# Patient Record
Sex: MALE | Race: WHITE | URBAN - METROPOLITAN AREA
[De-identification: names, ages, dates, MRNs, and addresses within clinical notes are randomized per-mention and may not be internally consistent; named-entity substitution may affect disease eponyms.]

---

## 2021-11-03 ENCOUNTER — OFFICE VISIT (OUTPATIENT)
Dept: FAMILY MEDICINE CLINIC | Age: 46
End: 2021-11-03
Payer: MEDICAID

## 2021-11-03 VITALS
BODY MASS INDEX: 33.07 KG/M2 | HEART RATE: 87 BPM | OXYGEN SATURATION: 97 % | SYSTOLIC BLOOD PRESSURE: 120 MMHG | HEIGHT: 75 IN | WEIGHT: 266 LBS | TEMPERATURE: 98 F | DIASTOLIC BLOOD PRESSURE: 82 MMHG

## 2021-11-03 DIAGNOSIS — G47.00 INSOMNIA, UNSPECIFIED TYPE: ICD-10-CM

## 2021-11-03 DIAGNOSIS — F25.1 SCHIZOAFFECTIVE DISORDER, DEPRESSIVE TYPE (HCC): ICD-10-CM

## 2021-11-03 DIAGNOSIS — K21.9 GASTROESOPHAGEAL REFLUX DISEASE WITHOUT ESOPHAGITIS: ICD-10-CM

## 2021-11-03 DIAGNOSIS — I10 ESSENTIAL HYPERTENSION: Primary | ICD-10-CM

## 2021-11-03 DIAGNOSIS — F90.9 ATTENTION DEFICIT HYPERACTIVITY DISORDER (ADHD), UNSPECIFIED ADHD TYPE: ICD-10-CM

## 2021-11-03 PROCEDURE — G8427 DOCREV CUR MEDS BY ELIG CLIN: HCPCS | Performed by: NURSE PRACTITIONER

## 2021-11-03 PROCEDURE — G8484 FLU IMMUNIZE NO ADMIN: HCPCS | Performed by: NURSE PRACTITIONER

## 2021-11-03 PROCEDURE — 1036F TOBACCO NON-USER: CPT | Performed by: NURSE PRACTITIONER

## 2021-11-03 PROCEDURE — 99204 OFFICE O/P NEW MOD 45 MIN: CPT | Performed by: NURSE PRACTITIONER

## 2021-11-03 PROCEDURE — G8419 CALC BMI OUT NRM PARAM NOF/U: HCPCS | Performed by: NURSE PRACTITIONER

## 2021-11-03 RX ORDER — OMEPRAZOLE 20 MG/1
20 CAPSULE, DELAYED RELEASE ORAL 2 TIMES DAILY
Qty: 90 CAPSULE | Refills: 1 | Status: SHIPPED | OUTPATIENT
Start: 2021-11-03 | End: 2021-12-01 | Stop reason: SDUPTHER

## 2021-11-03 RX ORDER — TRAZODONE HYDROCHLORIDE 100 MG/1
100 TABLET ORAL NIGHTLY
COMMUNITY
End: 2021-11-23 | Stop reason: SDUPTHER

## 2021-11-03 RX ORDER — ARIPIPRAZOLE 15 MG/1
15 TABLET ORAL DAILY
COMMUNITY
End: 2021-11-03 | Stop reason: SDUPTHER

## 2021-11-03 RX ORDER — FLUOXETINE HYDROCHLORIDE 20 MG/1
60 CAPSULE ORAL DAILY
COMMUNITY
End: 2021-11-03 | Stop reason: SDUPTHER

## 2021-11-03 RX ORDER — METHYLPHENIDATE HYDROCHLORIDE 36 MG/1
72 TABLET ORAL EVERY MORNING
Qty: 60 TABLET | Refills: 0 | Status: SHIPPED | OUTPATIENT
Start: 2021-11-03 | End: 2021-12-01 | Stop reason: SDUPTHER

## 2021-11-03 RX ORDER — ALPRAZOLAM 0.5 MG/1
0.5 TABLET ORAL NIGHTLY PRN
COMMUNITY
End: 2021-11-22 | Stop reason: SDUPTHER

## 2021-11-03 RX ORDER — METHYLPHENIDATE HYDROCHLORIDE 36 MG/1
36 TABLET ORAL EVERY MORNING
COMMUNITY
End: 2021-11-03 | Stop reason: SDUPTHER

## 2021-11-03 RX ORDER — AMLODIPINE BESYLATE 5 MG/1
5 TABLET ORAL DAILY
COMMUNITY
End: 2021-11-03 | Stop reason: SDUPTHER

## 2021-11-03 RX ORDER — FLUOXETINE HYDROCHLORIDE 20 MG/1
60 CAPSULE ORAL DAILY
Qty: 270 CAPSULE | Refills: 1 | Status: SHIPPED | OUTPATIENT
Start: 2021-11-03 | End: 2022-03-21 | Stop reason: SDUPTHER

## 2021-11-03 RX ORDER — OMEPRAZOLE 20 MG/1
20 CAPSULE, DELAYED RELEASE ORAL 2 TIMES DAILY
COMMUNITY
End: 2021-11-03 | Stop reason: SDUPTHER

## 2021-11-03 RX ORDER — AMLODIPINE BESYLATE 5 MG/1
5 TABLET ORAL DAILY
Qty: 90 TABLET | Refills: 1 | Status: SHIPPED | OUTPATIENT
Start: 2021-11-03 | End: 2021-12-01 | Stop reason: SDUPTHER

## 2021-11-03 RX ORDER — ARIPIPRAZOLE 15 MG/1
15 TABLET ORAL DAILY
Qty: 90 TABLET | Refills: 1 | Status: SHIPPED | OUTPATIENT
Start: 2021-11-03 | End: 2021-12-03 | Stop reason: SDUPTHER

## 2021-11-03 SDOH — ECONOMIC STABILITY: FOOD INSECURITY: WITHIN THE PAST 12 MONTHS, YOU WORRIED THAT YOUR FOOD WOULD RUN OUT BEFORE YOU GOT MONEY TO BUY MORE.: NEVER TRUE

## 2021-11-03 SDOH — ECONOMIC STABILITY: FOOD INSECURITY: WITHIN THE PAST 12 MONTHS, THE FOOD YOU BOUGHT JUST DIDN'T LAST AND YOU DIDN'T HAVE MONEY TO GET MORE.: NEVER TRUE

## 2021-11-03 ASSESSMENT — ENCOUNTER SYMPTOMS
ABDOMINAL PAIN: 0
SORE THROAT: 0
COUGH: 0
SHORTNESS OF BREATH: 0
VOMITING: 0
RHINORRHEA: 0
NAUSEA: 0
BACK PAIN: 0
CONSTIPATION: 0
ABDOMINAL DISTENTION: 0
DIARRHEA: 0
CHEST TIGHTNESS: 0

## 2021-11-03 ASSESSMENT — PATIENT HEALTH QUESTIONNAIRE - PHQ9
2. FEELING DOWN, DEPRESSED OR HOPELESS: 0
SUM OF ALL RESPONSES TO PHQ QUESTIONS 1-9: 0
SUM OF ALL RESPONSES TO PHQ QUESTIONS 1-9: 0
SUM OF ALL RESPONSES TO PHQ9 QUESTIONS 1 & 2: 0
SUM OF ALL RESPONSES TO PHQ QUESTIONS 1-9: 0
1. LITTLE INTEREST OR PLEASURE IN DOING THINGS: 0

## 2021-11-03 ASSESSMENT — SOCIAL DETERMINANTS OF HEALTH (SDOH): HOW HARD IS IT FOR YOU TO PAY FOR THE VERY BASICS LIKE FOOD, HOUSING, MEDICAL CARE, AND HEATING?: NOT HARD AT ALL

## 2021-11-03 NOTE — PROGRESS NOTES
Ray Goddard, APRN-CNP  [facility]  [id number] , [address]  [address] [address]  Dept: [phone]  Dept Fax: [phone]    Patient ID: Vish English is a 39 y.o. male. HPI:  Vish English is a 39 y.o. male who presents to the office today for a first visit and to establish a relationship with a new primary care physician. Today, the patient relates he recently moved here from [place] to be closer to his mom and his brother. He denies any major complaints. He does relate that he has been experiencing some mild discomfort in his left shoulder into his shoulder blade after getting the COVID booster. Pt denies any fever or chills. Pt today denies any HA, chest pain, or SOB. Pt denies any N/V/D/C or abdominal pain. Patient does report a history of HTN (on Amlodipine 5 mg dialy), ADHD (on Concerta 72 mg daily), schizoaffective/depressive disorder (on Prozac 20 mg daily and Abilify 15 mg daily), GERD (on Omeprazole 20 mg daily) and insomnia (on Trazodone 100 mg nightly and Xanax 0.5-1 mg nightly PRN). He does relate that he is hoping to get off of the Xanax secondary to the long term effects of being on it. Patient reports compliance with all medications and is tolerating them without side effects      Previous office notes, labs and diagnostic studies were reviewed prior to and during encounter. The patient's past medical, surgical, social, and family history as well as her current medications and allergies were reviewed as documented in today's encounter by Bita Officer, GOLD.  Preventative Care:   o Colonoscopy: never had  o PSA: N/A   o Smoking: denies  o Alcohol: denies   o Drug use: denies     Current Outpatient Medications on File Prior to Visit   Medication Sig Dispense Refill    ALPRAZolam (XANAX) 0.5 MG tablet Take 0.5 mg by mouth nightly as needed for Sleep.  Take 1-2 tablets nightly as needed      amLODIPine (NORVASC) 5 MG tablet Take 5 mg by mouth daily      FLUoxetine (PROZAC) 20 MG capsule Take 60 mg by mouth daily Take 3 capsules by mouth daily       methylphenidate (CONCERTA) 36 MG extended release tablet Take 36 mg by mouth every morning. Take 2 tablets by mouth daily      ARIPiprazole (ABILIFY) 15 MG tablet Take 15 mg by mouth daily      traZODone (DESYREL) 100 MG tablet Take 100 mg by mouth nightly Take 2 tablets by mouth every night      omeprazole (PRILOSEC) 20 MG delayed release capsule Take 20 mg by mouth 2 times daily       No current facility-administered medications on file prior to visit. SUBJECTIVE:     Review of Systems   Constitutional: Negative for activity change, fatigue and fever. HENT: Negative for congestion, ear pain, rhinorrhea and sore throat. Respiratory: Negative for cough, chest tightness and shortness of breath. Cardiovascular: Negative for chest pain and palpitations. Gastrointestinal: Negative for abdominal distention, abdominal pain, constipation, diarrhea, nausea and vomiting. Endocrine: Negative for polydipsia, polyphagia and polyuria. Genitourinary: Negative for difficulty urinating and dysuria. Musculoskeletal: Negative for arthralgias, back pain and myalgias. Skin: Negative for rash. Neurological: Negative for dizziness, weakness, light-headedness and headaches. Hematological: Negative for adenopathy. Psychiatric/Behavioral: Positive for decreased concentration (stable on Concerta), dysphoric mood (stable on Prozac & Abilify) and sleep disturbance (stable on Trazodone & Xanax). Negative for agitation and behavioral problems. The patient is nervous/anxious (stable). OBJECTIVE:  /82   Pulse 87   Temp 98 °F (36.7 °C)   Ht 6' 3\" (1.905 m)   Wt 266 lb (120.7 kg)   SpO2 97%   BMI 33.25 kg/m²     Physical Exam  Vitals and nursing note reviewed. Constitutional:       General: He is not in acute distress. Appearance: Normal appearance.  He is well-developed. HENT:      Head: Normocephalic and atraumatic. Cardiovascular:      Rate and Rhythm: Normal rate and regular rhythm. Heart sounds: Normal heart sounds. No murmur heard. Pulmonary:      Effort: Pulmonary effort is normal. No respiratory distress. Breath sounds: Normal breath sounds. Chest:      Chest wall: No tenderness. Abdominal:      General: Bowel sounds are normal.      Palpations: Abdomen is soft. Tenderness: There is no abdominal tenderness. Musculoskeletal:         General: Normal range of motion. Cervical back: Normal range of motion. Right lower leg: No edema. Left lower leg: No edema. Skin:     General: Skin is warm and dry. Findings: No rash. Neurological:      Mental Status: He is alert and oriented to person, place, and time. Psychiatric:         Mood and Affect: Mood normal.         Behavior: Behavior is cooperative. ASSESSMENT:   Diagnosis Orders   1. Essential hypertension  amLODIPine (NORVASC) 5 MG tablet   2. Gastroesophageal reflux disease without esophagitis  omeprazole (PRILOSEC) 20 MG delayed release capsule   3. Attention deficit hyperactivity disorder (ADHD), unspecified ADHD type  methylphenidate (CONCERTA) 36 MG extended release tablet     PLAN:  1. Essential hypertension  - Stable: Medication re-filled as needed, con't medications as prescribed, con't current tx plan  - BP in office today noted; 120/82  - Continue Amlodipine as prescribed by previous provider.    - amLODIPine (NORVASC) 5 MG tablet; Take 1 tablet by mouth daily  Dispense: 90 tablet; Refill: 1    2.  Gastroesophageal reflux disease without esophagitis  - Stable: Medication re-filled as needed, con't medications as prescribed, con't current tx plan  - Continue Omeprazole 20 mg daily as prescribed by previous provider.  - Patient educated on avoiding trigger food/drinks such as caffeine, soda, chocolate, greasy/fatty, spicy foods.  - Sleep with head of bed elevated, avoid lying flat after eating and avoid restrictive clothing around waist.  - omeprazole (PRILOSEC) 20 MG delayed release capsule; Take 1 capsule by mouth 2 times daily  Dispense: 90 capsule; Refill: 1    3. Attention deficit hyperactivity disorder (ADHD), unspecified ADHD type  - Stable: Medication re-filled as needed, con't medications as prescribed, con't current tx plan  - Patient admits to making careless mistakes/lacks attention to detail, has difficulty sustaining attention but feels this is much improved by the prescribed medication   - Continue Concerta 72 mg daily as prescribed by previous provider.  - OARRS report was assessed during today's visit. - Medication contract discussed, signed and scanned into media. - Patient was found to be compliant with narcotic prescription policies   - Will continue to follow every 3 months  - methylphenidate (CONCERTA) 36 MG extended release tablet; Take 2 tablets by mouth every morning for 30 days. Dispense: 60 tablet; Refill: 0     4. Schizoaffective disorder, depressive type ([facility] [address]  - Stable: Medication re-filled as needed, con't medications as prescribed, con't current tx plan  - Continue with Abilify 15 mg daily as prescribed by previous provider.   - Continue with Prozac 20 mg daily as prescribed by previous provider.     - Offered reassurance, allowed to ventilate feelings and ask questions; non-pharmological coping methods discussed. - ARIPiprazole (ABILIFY) 15 MG tablet; Take 1 tablet by mouth daily  Dispense: 90 tablet; Refill: 1  - FLUoxetine (PROZAC) 20 MG capsule; Take 3 capsules by mouth daily Take 3 capsules by mouth daily  Dispense: 270 capsule; Refill: 1    5. Insomnia, unspecified type  - Stable: Medication re-filled as needed, con't medications as prescribed, con't current tx plan  - Continue with Trazodone 100 mg nightly as prescribed by previous provider.   - Continue with Xanax 0.5 mg - 1 mg as prescribed by previous provider.   - We did discuss cutting his dosage down at his next refill to begin weaning him off.      - Discussed sleep hygiene, limiting caffeine after noon and decreasing naps during the day. - ALPRAZolam (XANAX) 0.5 MG tablet; Take 0.5 mg by mouth nightly as needed for Sleep. Take 1-2 tablets nightly as needed  - traZODone (DESYREL) 100 MG tablet; Take 100 mg by mouth nightly Take 2 tablets by mouth every night        - Medications, labs, diagnostic studies, consultations and follow-up as documented in this encounter.    - On this date November 3, 2021,  I have spent greater than 50% of this visit reviewing previous notes, test results and/or face to face with the patient discussing the diagnoses, importance of compliance with the treatment plan, counseling, coordinating care as well as documenting on the day of the visit.      DORIS Coleman-CNP

## 2021-11-03 NOTE — LETTER
CONTROLLED SUBSTANCE MEDICATION AGREEMENT     Patient Name: Raul Gregg  Patient YOB: 1975   I understand, that controlled substance medications may be used to help better manage my symptoms and to improve my ability to function at home, work and in social settings. However, I also understand that these medications do have risks, which have been discussed with me, including possible development of physical or psychological dependence. I understand that successful treatment requires mutual trust and honesty between me and my provider. I understand and agree that following this Medication Agreement is necessary in continuing my provider-patient relationship and the success of my treatment plan. Explanation from my Provider: Benefits and Goals of Controlled Substance Medications: There are two potential goals for your treatment: (1) decreased pain and suffering (2) improved daily life functions. There are many possible treatments for your chronic condition(s). Alternatives such as physical therapy, yoga, massage, home daily exercise, meditation, relaxation techniques, injections, chiropractic manipulations, surgery, cognitive therapy, hypnosis and many medications that are not habit-forming may be used. Use of controlled substance medications may be helpful, but they are unlikely to resolve all symptoms or restore all function. Explanation from my Provider: Risks of Controlled Substance Medications:  Opioid pain medications: These medications can lead to problems such as addiction/dependence, sedation, lightheadedness/dizziness, memory issues, falls, constipation, nausea, or vomiting. They may also impair the ability to drive or operate machinery. Additionally, these medications may lower testosterone levels, leading to loss of bone strength, stamina and sex drive.   They may cause problems with breathing, sleep apnea and reduced coughing, which is especially dangerous for patients with lung disease. Overdose or dangerous interactions with alcohol and other medications may occur, leading to death. Hyperalgesia may develop, which means patients receiving opioids for the treatment of pain may become more sensitive to certain painful stimuli, and in some cases, experience pain from ordinarily non-painful stimuli. Women between the ages of 14-53 who could become pregnant should carefully weigh the risks and benefits of opioids with their physicians, as these medications increase the risk of pregnancy complications, including miscarriage,  delivery and stillbirth. It is also possible for babies to be born addicted to opioids. Opioid dependence withdrawal symptoms may include; feelings of uneasiness, increased pain, irritability, belly pain, diarrhea, sweats and goose-flesh. Benzodiazepines and non-benzodiazepine sleep medications: These medications can lead to problems such as addiction/dependence, sedation, fatigue, lightheadedness, dizziness, incoordination, falls, depression, hallucinations, and impaired judgment, memory and concentration. The ability to drive and operate machinery may also be affected. Abnormal sleep-related behaviors have been reported, including sleepwalking, driving, making telephone calls, eating, or having sex while not fully awake. These medications can suppress breathing and worsen sleep apnea, particularly when combined with alcohol or other sedating medications, potentially leading to death. Dependence withdrawal symptoms may include tremors, anxiety, hallucinations and seizures. Stimulants:  Common adverse effects include addiction/dependence, increased blood  pressure and heart rate, decreased appetite, nausea, involuntary weight loss, insomnia,                                                                                                                     Initials:_______   irritability, and headaches.   These risks may increase when these medications are combined with other stimulants, such as caffeine pills or energy drinks, certain weight loss supplements and oral decongestants. Dependence withdrawal symptoms may include depressed mood, loss of interest, suicidal thoughts, anxiety, fatigue, appetite changes and agitation. Testosterone replacement therapy:  Potential side effects include increased risk of stroke and heart attack, blood clots, increased blood pressure, increased cholesterol, enlarged prostate, sleep apnea, irritability/aggression and other mood disorders, and decreased fertility. I agree and understand that I and my prescriber have the following rights and responsibilities regarding my treatment plan:     1. MY RIGHTS:  To be informed of my treatment and medication plan. To be an active participant in my health and wellbeing. 2. MY RESPONSIBILITY AND UNDERSTANDING FOR USE OF MEDICATIONS   I will take medications at the dose and frequency as directed. For my safety, I will not increase or change how I take my medications without the recommendation of my healthcare provider.  I will actively participate in any program recommended by my provider which may improve function, including social, physical, psychological programs.  I will not take my medications with alcohol or other drugs not prescribed to me. I understand that drinking alcohol with my medications increases the chances of side effects, including reduced breathing rate and could lead to personal injury when operating machinery.  I understand that if I have a history of substance use disorders, including alcohol or other illicit drugs, that I may be at increased risk of addiction to my medications.  I agree to notify my provider immediately if I should become pregnant so that my treatment plan can be adjusted.    I agree and understand that I shall only receive controlled substance medications from the prescriber that signed this agreement unless there is written agreement among other prescribers of controlled substances outlining the responsibility of the medications being prescribed.  I understand that the if the controlled medication is not helping to achieve goals, the dosage may be tapered and no longer prescribed. 3. MY RESPONSIBILITY FOR COMMUNICATION / PRESCRIPTION RENEWALS   I agree that all controlled substance medications that I take will be prescribed only by my provider. If another healthcare provider prescribes me medication in an emergency, I will notify my provider within seventy-two (72) hours.  I will arrange for refills at the prescribed interval ONLY during regular office hours. I will not ask for refills earlier than agreed, after-hours, on holidays or weekends. Refills may take up to 72 hours for processing and prescriptions to reach the pharmacy.  I will inform my other health care providers that I am taking these medications and of the existence of this [id number]. In the event of an emergency, I will provide the same information to the emergency department prescribers.  I will keep my provider updated on the pharmacy I am using for controlled medication prescription filling. Initials:_______  4. MY RESPONSIBILITY FOR PROTECTING MEDICATIONS   I will protect my prescriptions and medications. I understand that lost or misplaced prescriptions will not be replaced.  I will keep medications only for my own use and will not share them with others. I will keep all medications away from children.  I agree that if my medications are adjusted or discontinued, I will properly dispose of any remaining medications. I understand that I will be required to dispose of any remaining controlled medications as, directed by my prescriber, prior to being provided with any prescriptions for other controlled medications.   Medication drop box locations can be found at: controlled substance medications, in their original bottles, to all of my scheduled appointments. In addition, my provider may ask me to come to the practice at any time for a random pill count. 8. TERMINATION OF THIS AGREEMENT  For my safety, my prescriber has the right to stop prescribing controlled substance medications and may end this agreement. Initials:_______   Conditions that may result in termination of this agreement:  a. I do not show any improvement in pain, or my activity has not improved. b. I develop rapid tolerance or loss of improvement, as described in my treatment plan.  c. I develop significant side effects from the medication. d. My behavior is not consistent with the responsibilities outlined above, thereby causing safety concerns to continue prescribing controlled substance medications. e. I fail to follow the terms of this agreement. f. Other:____________________________       UNDERSTANDING THIS MEDICATION AGREEMENT:    I have read the above and have had all my questions answered. For chronic disease management, I know that my symptoms can be managed with many types of treatments. A chronic medication trial may be part of my treatment, but I must be an active participant in my care. Medication therapy is only one part of my symptom management plan. In some cases, there may be limited scientific evidence to support the chronic use of certain medications to improve symptoms and daily function. Furthermore, in certain circumstances, there may be scientific information that suggests that the use of chronic controlled substances may worsen my symptoms and increase my risk of unintentional death directly related to this medication therapy. I know that if my provider feels my risk from controlled medications is greater than my benefit, I will have my controlled substance medication(s) compassionately lowered or removed altogether.      I further agree to allow this office to contact my HIPAA contact if there are concerns about my safety and use of the controlled medications. I have agreed to use the prescribed controlled substance medications to me as instructed by my provider and as stated in this Medication Agreement. My initial on each page and my signature below shows that I have read each page and I have had the opportunity to ask questions with answers provided by my provider.     Patient Name (Printed): _____________________________________  Patient Signature:  ______________________   Date: _____________    Prescriber Name (Printed): ___________________________________  Prescriber Signature: _____________________  Date: _____________

## 2021-11-22 DIAGNOSIS — G47.00 INSOMNIA, UNSPECIFIED TYPE: ICD-10-CM

## 2021-11-22 RX ORDER — ALPRAZOLAM 0.5 MG/1
0.5 TABLET ORAL NIGHTLY PRN
Qty: 30 TABLET | Refills: 0 | Status: SHIPPED | OUTPATIENT
Start: 2021-11-22 | End: 2021-12-03 | Stop reason: SDUPTHER

## 2021-11-22 NOTE — TELEPHONE ENCOUNTER
----- Message from Arjun Garcia sent at 11/20/2021 10:18 AM EST -----  Subject: Refill Request    QUESTIONS  Name of Medication? Other - Alprazolam (xanax) 0.5 mg tablet  Patient-reported dosage and instructions? Take 0.5 twice a day  How many days do you have left? 2  Preferred Pharmacy? [facility] phone number (if available)? [phone]  ---------------------------------------------------------------------------  --------------,  Name of Medication? traZODone (DESYREL) 100 MG tablet  Patient-reported dosage and instructions? Take 2 tablets nightly  How many days do you have left? 5  Preferred Pharmacy? [facility] phone number (if available)? [phone]  ---------------------------------------------------------------------------  --------------  CALL BACK INFO  What is the best way for the office to contact you? OK to leave message on   voicemail  Preferred Call Back Phone Number?  [phone]
no

## 2021-11-23 ENCOUNTER — TELEPHONE (OUTPATIENT)
Dept: FAMILY MEDICINE CLINIC | Age: 46
End: 2021-11-23

## 2021-11-23 DIAGNOSIS — G47.00 INSOMNIA, UNSPECIFIED TYPE: ICD-10-CM

## 2021-11-23 RX ORDER — TRAZODONE HYDROCHLORIDE 100 MG/1
100 TABLET ORAL NIGHTLY
Qty: 180 TABLET | Refills: 1 | Status: SHIPPED | OUTPATIENT
Start: 2021-11-23 | End: 2021-12-01 | Stop reason: SDUPTHER

## 2021-11-23 NOTE — TELEPHONE ENCOUNTER
Patient states that he is to get 60 xanax because he takes 2 tabs nightly. He picked up his rx yesterday for this and it was called in for 27.   I told him that the pharmacy will probably not refill the xanax again for 30 days

## 2021-11-23 NOTE — TELEPHONE ENCOUNTER
Pharmacy downstairs calling for clarification of traZODone. Should patient be taking one or two pills nightly?

## 2021-12-01 DIAGNOSIS — I10 ESSENTIAL HYPERTENSION: ICD-10-CM

## 2021-12-01 DIAGNOSIS — G47.00 INSOMNIA, UNSPECIFIED TYPE: ICD-10-CM

## 2021-12-01 DIAGNOSIS — F90.9 ATTENTION DEFICIT HYPERACTIVITY DISORDER (ADHD), UNSPECIFIED ADHD TYPE: ICD-10-CM

## 2021-12-01 DIAGNOSIS — K21.9 GASTROESOPHAGEAL REFLUX DISEASE WITHOUT ESOPHAGITIS: ICD-10-CM

## 2021-12-01 RX ORDER — AMLODIPINE BESYLATE 5 MG/1
5 TABLET ORAL DAILY
Qty: 90 TABLET | Refills: 1 | Status: SHIPPED | OUTPATIENT
Start: 2021-12-01 | End: 2022-01-03 | Stop reason: SDUPTHER

## 2021-12-01 RX ORDER — OMEPRAZOLE 20 MG/1
20 CAPSULE, DELAYED RELEASE ORAL 2 TIMES DAILY
Qty: 90 CAPSULE | Refills: 1 | Status: SHIPPED | OUTPATIENT
Start: 2021-12-01 | End: 2022-01-03 | Stop reason: SDUPTHER

## 2021-12-01 RX ORDER — METHYLPHENIDATE HYDROCHLORIDE 36 MG/1
72 TABLET ORAL EVERY MORNING
Qty: 60 TABLET | Refills: 0 | Status: SHIPPED | OUTPATIENT
Start: 2021-12-01 | End: 2021-12-27 | Stop reason: SDUPTHER

## 2021-12-01 RX ORDER — TRAZODONE HYDROCHLORIDE 100 MG/1
100 TABLET ORAL NIGHTLY
Qty: 180 TABLET | Refills: 1 | Status: SHIPPED | OUTPATIENT
Start: 2021-12-01 | End: 2021-12-21 | Stop reason: SDUPTHER

## 2021-12-02 ENCOUNTER — NURSE ONLY (OUTPATIENT)
Dept: FAMILY MEDICINE CLINIC | Age: 46
End: 2021-12-02
Payer: MEDICAID

## 2021-12-02 DIAGNOSIS — Z23 NEED FOR VACCINATION: Primary | ICD-10-CM

## 2021-12-02 PROCEDURE — 90471 IMMUNIZATION ADMIN: CPT | Performed by: NURSE PRACTITIONER

## 2021-12-02 PROCEDURE — 90674 CCIIV4 VAC NO PRSV 0.5 ML IM: CPT | Performed by: NURSE PRACTITIONER

## 2021-12-07 DIAGNOSIS — G47.00 INSOMNIA, UNSPECIFIED TYPE: ICD-10-CM

## 2021-12-07 RX ORDER — ALPRAZOLAM 0.5 MG/1
0.5 TABLET ORAL NIGHTLY PRN
Qty: 30 TABLET | Refills: 0 | Status: CANCELLED | OUTPATIENT
Start: 2021-12-07 | End: 2022-01-06

## 2021-12-07 RX ORDER — ALPRAZOLAM 0.5 MG/1
0.5 TABLET ORAL NIGHTLY PRN
Qty: 60 TABLET | Refills: 0 | Status: SHIPPED | OUTPATIENT
Start: 2021-12-07 | End: 2022-01-03 | Stop reason: SDUPTHER

## 2021-12-07 NOTE — TELEPHONE ENCOUNTER
I called the pharmacy and patient did not  his xanax rx from 12/03/2021. Patient states that he takes 1-2 tablets nightly. He asks that a 30 day rx of 60 tabs please be called in.   Thank you

## 2021-12-07 NOTE — TELEPHONE ENCOUNTER
Pt states he needs a new prescription of this written and sent in because its too early to refill it. The reason it's too early the pt states the last time he received it there was only a 15 day supply. Pt states he takes 2 .5 mg tablets per night for a total of 1 mg. Pt states his last supply was only 30 tablets so it lasted 15 days. Pt states he needs a total of 60 tablets for a one month supply. Pt states he is completely out and would like it to be refilled as soon as possible. Please Advise.

## 2021-12-21 DIAGNOSIS — G47.00 INSOMNIA, UNSPECIFIED TYPE: ICD-10-CM

## 2021-12-21 RX ORDER — TRAZODONE HYDROCHLORIDE 100 MG/1
100 TABLET ORAL NIGHTLY
Qty: 180 TABLET | Refills: 1 | Status: SHIPPED | OUTPATIENT
Start: 2021-12-21 | End: 2022-02-07

## 2022-01-20 ENCOUNTER — TELEMEDICINE (OUTPATIENT)
Dept: BEHAVIORAL/MENTAL HEALTH CLINIC | Age: 47
End: 2022-01-20
Payer: MEDICAID

## 2022-01-20 DIAGNOSIS — F34.1 PERSISTENT DEPRESSIVE DISORDER: Primary | ICD-10-CM

## 2022-01-20 PROCEDURE — 1036F TOBACCO NON-USER: CPT | Performed by: PSYCHOLOGIST

## 2022-01-20 PROCEDURE — 90791 PSYCH DIAGNOSTIC EVALUATION: CPT | Performed by: PSYCHOLOGIST

## 2022-01-20 ASSESSMENT — PATIENT HEALTH QUESTIONNAIRE - PHQ9
SUM OF ALL RESPONSES TO PHQ QUESTIONS 1-9: 20
SUM OF ALL RESPONSES TO PHQ9 QUESTIONS 1 & 2: 6
10. IF YOU CHECKED OFF ANY PROBLEMS, HOW DIFFICULT HAVE THESE PROBLEMS MADE IT FOR YOU TO DO YOUR WORK, TAKE CARE OF THINGS AT HOME, OR GET ALONG WITH OTHER PEOPLE: 3
8. MOVING OR SPEAKING SO SLOWLY THAT OTHER PEOPLE COULD HAVE NOTICED. OR THE OPPOSITE, BEING SO FIGETY OR RESTLESS THAT YOU HAVE BEEN MOVING AROUND A LOT MORE THAN USUAL: 2
SUM OF ALL RESPONSES TO PHQ QUESTIONS 1-9: 22
9. THOUGHTS THAT YOU WOULD BE BETTER OFF DEAD, OR OF HURTING YOURSELF: 2
SUM OF ALL RESPONSES TO PHQ QUESTIONS 1-9: 22
5. POOR APPETITE OR OVEREATING: 3
6. FEELING BAD ABOUT YOURSELF - OR THAT YOU ARE A FAILURE OR HAVE LET YOURSELF OR YOUR FAMILY DOWN: 3
3. TROUBLE FALLING OR STAYING ASLEEP: 3
2. FEELING DOWN, DEPRESSED OR HOPELESS: 3
4. FEELING TIRED OR HAVING LITTLE ENERGY: 3
1. LITTLE INTEREST OR PLEASURE IN DOING THINGS: 3
SUM OF ALL RESPONSES TO PHQ QUESTIONS 1-9: 22

## 2022-01-20 ASSESSMENT — COLUMBIA-SUICIDE SEVERITY RATING SCALE - C-SSRS
1. WITHIN THE PAST MONTH, HAVE YOU WISHED YOU WERE DEAD OR WISHED YOU COULD GO TO SLEEP AND NOT WAKE UP?: YES
7. DID THIS OCCUR IN THE LAST THREE MONTHS: NO
2. HAVE YOU ACTUALLY HAD ANY THOUGHTS OF KILLING YOURSELF?: NO
6. HAVE YOU EVER DONE ANYTHING, STARTED TO DO ANYTHING, OR PREPARED TO DO ANYTHING TO END YOUR LIFE?: YES
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
## 2022-02-01 ENCOUNTER — TELEMEDICINE (OUTPATIENT)
Dept: BEHAVIORAL/MENTAL HEALTH CLINIC | Age: 47
End: 2022-02-01
Payer: MEDICAID

## 2022-02-01 DIAGNOSIS — F34.1 PERSISTENT DEPRESSIVE DISORDER: Primary | ICD-10-CM

## 2022-02-01 PROCEDURE — 90834 PSYTX W PT 45 MINUTES: CPT | Performed by: PSYCHOLOGIST

## 2022-02-01 PROCEDURE — 1036F TOBACCO NON-USER: CPT | Performed by: PSYCHOLOGIST

## 2022-02-01 NOTE — PROGRESS NOTES
ADULT BEHAVIORAL HEALTH FOLLOW UP  MYLES Beatty M.A. Psychology Doctoral Trainee    Supervising Clinical Psychologists:  Sariah Cortes, Ph.D. Pedro Garza,  Ph.D. Carlyn Cleveland Psy.D. [place] License [id number]    Pt provided verbal consent to engage in telehealth psychotherapy. This visit was completed virtually using [email or web address]. due to contact restrictions related to the COVID-19 pandemic. Session was held in a private place: pt's bedroom  Due to the virtual delivery of services, certain behavioral observations were not assessed during this appointment. Pt provided verbal consent for the following emergency contact:  Jass Enriquez (mother)  [phone]      [facility] Emergency Department/Clinician location: [facility], [place], [place]    Visit Date: 2/1/2022   Time of appointment:  9:01a to 9:45a   Time spent with Patient: 44 minutes. This is patient's second appointment. Reason for Consult:  Anxiety and Depression     Referring Provider/PCP:    No ref. provider found  DORIS Knowles NP      Pt provided informed consent for the behavioral health program. Discussed with patient model of service to include the limits of confidentiality (i.e. abuse reporting, suicide intervention, etc.) and short-term intervention focused approach. Pt indicated understanding. Pura Lozano is a 42 y.o. male who presents for follow up of depression and anxiety. This session consisted of psychoeducation and examples of ACT approaches to Lucien's depression. Radha Mercado and [facility] discussed core ACT concepts, including creative hopelessness and defusion. Radha Mercado reported reading the [employer] and shared insights with therapist. [facility] and patient worked together to analyze thoughts and apply ACT principles when appropriate. [facility] introduced a defusion skill and gave an example of how it might apply to Lucien's experience.     Previous Recommendations:   Radha Mercado will note 1-2 salient experiences of distressing mood/cognition that occur over the next two weeks and report these in session. [facility] and Wiley Hendricks will work together to contextualize these experiences within the cognitive triangle and generate ideas for custom intervention tools (defusion skills, grounding, mindfulness, etc.). Next session will be 2/1 at 9 am.      MENTAL STATUS EXAM  Mood was decreased range and dysthymic with congruent affect. Suicidal ideation was denied. Homicidal ideation was denied. Hygiene was [virtual, unable to assess]. Dress was appropriate. Behavior was Within Normal Limits with No observation or self-report of difficulties ambulating. Attitude was Cooperative, [place] and Friendly. Eye-contact was good. Speech: rate - WNL, rhythm - WNL, volume - WNL and monotone. Verbalizations were goal directed and coherent. Thought processes were intact and goal-oriented without evidence of delusions, hallucinations, obsessions, or mahesh; with little cognitive distortions. Associations were characterized by intact cognitive processes. Pt was oriented to person, place, time, and general circumstances;  recent:  good. Insight and judgment were estimated to be good, AEB, a good  understanding of cyclical maladaptive patterns, and the ability to use insight to inform behavior change. ASSESSMENT  Elsie West presented to the appointment today for evaluation and treatment of symptoms of anxiety and depression. He is currently deemed no risk to himself or others and meets criteria for Persistent Depressive Disorder w/ anxious distress. Wiley Hendricks reported reading materials [facility] suggested since last session and finding them helpful. He was receptive to additional psychoeducation and communicated intent to read further in book suggested by [facility]. He was also receptive to skill example provided in session and agreed to workshop it between sessions.  [facility] suggested weekly follow up when possible in the current stage of treatment, so that pt and [facility] can develop initial defusion skills and continue learning ACT concepts. Xuan Pinto was in agreement with recommendations. PHQ Scores 1/20/2022 1/15/2022 11/3/2021   PHQ2 Score 6 6 0   PHQ9 Score 22 24 0     Interpretation of Total Score Depression Severity: 1-4 = Minimal depression, 5-9 = Mild depression, 10-14 = Moderate depression, 15-19 = Moderately severe depression, 20-27 = Severe depression    How often pt has had thoughts of death or hurting self (if PHQ positive for depression):       No flowsheet data found. Interpretation of BARTOLOME-7 score: 5-9 = mild anxiety, 10-14 = moderate anxiety, 15+ = severe anxiety. Recommend referral to behavioral health for scores 10 or greater. DIAGNOSIS  Xuan Pinto was seen today for anxiety and depression. Diagnoses and all orders for this visit:    Persistent depressive disorder          INTERVENTION  Discussed various factors related to the development and maintenance of  depression (including biological, cognitive, behavioral, and environmental factors), Trained in strategies for increasing balanced thinking, Provided education, Motivational Interviewing to increase patient confidence and compliance with adhering to behavioral change plan, Motivational Interviewing to determine importance and readiness for change, Discussed potential barriers to change, Established rapport, Conducted functional assessment, Desdemona-setting to identify pt's primary goals for [facility] visit / overall health, Supportive techniques, Provided Psychoeducation re: acceptance and defusion, Collaborative treatment planning,Clarified role of [facility] in primary care,Recommended that pt establish with a mental health clinician with whom they can meet regularly for psychotherapy services and Reviewed options for identifying appropriate providers      PLAN  Xuan Pinto will read about the six core principles of ACT, and research defusion skills between sessions to get ideas for developing his own.  He will note particularly distressing thoughts/feelings and bring a description of them to next session as \"material\" for skill-building. Next session in 1 week, 2/8. INTERACTIVE COMPLEXITY  Is interactive complexity present?   No  Reason:  N/A  Additional Supporting Information:  N/A       Electronically signed by Andrae Cheatham on 9/8/39 at 9:00 AM EST

## 2022-02-07 ENCOUNTER — TELEPHONE (OUTPATIENT)
Dept: FAMILY MEDICINE CLINIC | Age: 47
End: 2022-02-07

## 2022-02-07 ENCOUNTER — TELEMEDICINE (OUTPATIENT)
Dept: FAMILY MEDICINE CLINIC | Age: 47
End: 2022-02-07
Payer: MEDICAID

## 2022-02-07 DIAGNOSIS — F90.9 ATTENTION DEFICIT HYPERACTIVITY DISORDER (ADHD), UNSPECIFIED ADHD TYPE: Primary | ICD-10-CM

## 2022-02-07 DIAGNOSIS — K21.9 GASTROESOPHAGEAL REFLUX DISEASE WITHOUT ESOPHAGITIS: ICD-10-CM

## 2022-02-07 DIAGNOSIS — G47.00 INSOMNIA, UNSPECIFIED TYPE: ICD-10-CM

## 2022-02-07 DIAGNOSIS — F25.1 SCHIZOAFFECTIVE DISORDER, DEPRESSIVE TYPE (HCC): ICD-10-CM

## 2022-02-07 PROCEDURE — G8417 CALC BMI ABV UP PARAM F/U: HCPCS | Performed by: NURSE PRACTITIONER

## 2022-02-07 PROCEDURE — 1036F TOBACCO NON-USER: CPT | Performed by: NURSE PRACTITIONER

## 2022-02-07 PROCEDURE — G8427 DOCREV CUR MEDS BY ELIG CLIN: HCPCS | Performed by: NURSE PRACTITIONER

## 2022-02-07 PROCEDURE — G8482 FLU IMMUNIZE ORDER/ADMIN: HCPCS | Performed by: NURSE PRACTITIONER

## 2022-02-07 PROCEDURE — 99214 OFFICE O/P EST MOD 30 MIN: CPT | Performed by: NURSE PRACTITIONER

## 2022-02-07 RX ORDER — TRAZODONE HYDROCHLORIDE 100 MG/1
100 TABLET ORAL NIGHTLY
Qty: 180 TABLET | Refills: 1 | Status: SHIPPED | OUTPATIENT
Start: 2022-02-07 | End: 2022-04-25 | Stop reason: SDUPTHER

## 2022-02-07 ASSESSMENT — ENCOUNTER SYMPTOMS
NAUSEA: 0
VOMITING: 0
COUGH: 0
BACK PAIN: 0
CONSTIPATION: 0
ABDOMINAL PAIN: 0
RHINORRHEA: 0
ABDOMINAL DISTENTION: 0
SORE THROAT: 0
SHORTNESS OF BREATH: 0
DIARRHEA: 0
CHEST TIGHTNESS: 0

## 2022-02-07 NOTE — TELEPHONE ENCOUNTER
----- Message from DORIS Huffman NP sent at 2/7/2022  7:57 AM EST -----  Can we call the sleep center at the [facility] - Dr. Jailyn Hinton (unsure of the spelling) to try to get his sleep study report from a year ago? Please and thank you!

## 2022-02-07 NOTE — PROGRESS NOTES
Maty Montgomery, APRN-[address]  [id number]  [address] [address]  Dept: [phone]  Dept Fax: [phone]     PATIENT ID: Gilberto Gonzalez is a 42 y.o. male. HPI:  Established patient presenting via virtual visit today for f/u on ADHD and medication refills. Pt denies any fever or chills. Pt today denies any HA, chest pain, or SOB. Pt denies any N/V/D/C or abdominal pain. Pt states concentration and ability to focus are stable on meds. Patient admits to making careless mistakes/lacks attention to detail, has difficulty sustaining attention but feels this is much improved by the prescribed medication. He relates that he is able to follow through on tasks and is less distracted. He continues to take Xanax 1 mg at night along with Trazodone 200 mg nightly to help with sleep. He relates that he is still not getting good quality of sleep. He does report a history of PRASHANTH (last sleep study was done a year ago, in [place]) but unable to tolerate the CPAP. My previous office notes, labs and diagnostic studies were reviewed prior to and during encounter. The patient's past medical, surgical, social, and family history as well as her current medications and allergies were reviewed as documented in today's encounter by Lenita Koyanagi, RMA. Current Outpatient Medications on File Prior to Visit   Medication Sig Dispense Refill    methylphenidate (CONCERTA) 36 MG extended release tablet Take 2 tablets by mouth every morning for 30 days.  60 tablet 0    ALPRAZolam (XANAX) 0.5 MG tablet TAKE ONE TO TWO TABLETS BY MOUTH AT BEDTIME AS NEEDED FOR SLEEP 60 tablet 0    amLODIPine (NORVASC) 5 MG tablet Take 1 tablet by mouth daily 90 tablet 1    omeprazole (PRILOSEC) 20 MG delayed release capsule Take 1 capsule by mouth 2 times daily 90 capsule 1    ARIPiprazole (ABILIFY) 15 MG tablet Take 1 tablet by mouth daily 90 tablet 1    FLUoxetine (PROZAC) 20 MG capsule Take 3 capsules by mouth daily Take 3 capsules by mouth daily 270 capsule 1     No current facility-administered medications on file prior to visit. SUBJECTIVE:     Review of Systems   Constitutional: Negative for activity change, fatigue and fever. HENT: Negative for congestion, ear pain, rhinorrhea and sore throat. Respiratory: Negative for cough, chest tightness and shortness of breath. Cardiovascular: Negative for chest pain and palpitations. Gastrointestinal: Negative for abdominal distention, abdominal pain, constipation, diarrhea, nausea and vomiting. Endocrine: Negative for polydipsia, polyphagia and polyuria. Genitourinary: Negative for difficulty urinating and dysuria. Musculoskeletal: Negative for arthralgias, back pain and myalgias. Skin: Negative for rash. Neurological: Negative for dizziness, weakness, light-headedness and headaches. Hematological: Negative for adenopathy. Psychiatric/Behavioral: Positive for decreased concentration (stable on Concerta 72 mg daily), dysphoric mood (stable on Prozac 20 mg daily & Abilify 15 mg nightly) and sleep disturbance (not well controlled with Trazodone 200 mg nightly & Xanax 1 mg nightly). Negative for agitation and behavioral problems. The patient is not nervous/anxious. OBJECTIVE:  There were no vitals taken during this encounter, as this is a virtual visit. Physical Exam  Vitals reviewed: Vital signs unavailable, as this is a virtual visit. Constitutional:       General: He is not in acute distress. Appearance: Normal appearance. He is not ill-appearing or toxic-appearing. Pulmonary:      Effort: No tachypnea or accessory muscle usage. Comments: Patient able to talk in full sentences without difficulty   Neurological:      General: No focal deficit present. Mental Status: He is alert and oriented to person, place, and time.    Psychiatric:         Mood and Affect: Mood normal.         Speech: Speech normal.         Behavior: Behavior normal. Behavior is cooperative. ASSESSMENT:   Diagnosis Orders   1. Attention deficit hyperactivity disorder (ADHD), unspecified ADHD type     2. Insomnia, unspecified type     3. Schizoaffective disorder, depressive type ([facility]     4. Gastroesophageal reflux disease without esophagitis       PLAN:  1. Attention deficit hyperactivity disorder (ADHD), unspecified ADHD type  - Stable: Medication re-filled as needed, con't medications as prescribed, con't current tx plan  - Patient admits to making careless mistakes/lacks attention to detail, has difficulty sustaining attention but feels this is much improved by the prescribed medication   - Continue Concerta 72 mg daily as previously prescribed  - OARRS report was assessed during today's visit. - Patient was found to be compliant with narcotic prescription policies   - Will continue to follow every 3 months. 2. Insomnia, unspecified type  - Stable: Medication re-filled as needed, con't medications as prescribed, con't current tx plan  - Continue with Trazodone 200 mg nightly as previously prescribed. - Continue with Xanax 1 mg nightly as previously prescribed. - Discussed sleep hygiene, limiting caffeine after noon and decreasing naps during the day. 3. Schizoaffective disorder, depressive type ([facility]  - Stable: Medication re-filled as needed, con't medications as prescribed, con't current tx plan  - Continue with Prozac 20 mg daily as previously prescribed. - Continue with Abilify 15 mg nightly as previously prescribed. - Offered reassurance, allowed to ventilate feelings and ask questions; non-pharmological coping methods discussed.      4. Gastroesophageal reflux disease without esophagitis  - Stable: Medication re-filled as needed, con't medications as prescribed, con't current tx plan  - Continue Omeprazole 20 mg daily as previously prescribed  - Patient educated on

## 2022-02-08 ENCOUNTER — TELEMEDICINE (OUTPATIENT)
Dept: BEHAVIORAL/MENTAL HEALTH CLINIC | Age: 47
End: 2022-02-08
Payer: MEDICAID

## 2022-02-08 DIAGNOSIS — F34.1 PERSISTENT DEPRESSIVE DISORDER: Primary | ICD-10-CM

## 2022-02-08 PROCEDURE — 1036F TOBACCO NON-USER: CPT | Performed by: PSYCHOLOGIST

## 2022-02-08 PROCEDURE — 90834 PSYTX W PT 45 MINUTES: CPT | Performed by: PSYCHOLOGIST

## 2022-02-22 ENCOUNTER — TELEMEDICINE (OUTPATIENT)
Dept: BEHAVIORAL/MENTAL HEALTH CLINIC | Age: 47
End: 2022-02-22
Payer: MEDICAID

## 2022-02-22 DIAGNOSIS — F34.1 PERSISTENT DEPRESSIVE DISORDER: Primary | ICD-10-CM

## 2022-02-22 PROCEDURE — 90834 PSYTX W PT 45 MINUTES: CPT | Performed by: PSYCHOLOGIST

## 2022-02-22 PROCEDURE — 1036F TOBACCO NON-USER: CPT | Performed by: PSYCHOLOGIST

## 2022-02-22 NOTE — PROGRESS NOTES
ADULT BEHAVIORAL HEALTH FOLLOW UP  MYLES Ellis M.A. Psychology Doctoral Trainee    Supervising Clinical Psychologists:  Ken Singh, Ph.D. Deshawn Han,  Ph.D. Tammi Leon Psy.D. [place] License [id number]    Pt provided verbal consent to engage in telehealth psychotherapy. This visit was completed virtually using [employer]. due to contact restrictions related to the COVID-19 pandemic. Session was held in a private place: pt's office. Due to the virtual delivery of services, certain behavioral observations were not assessed during this appointment. Pt provided verbal consent for the following emergency contact:  Carolyn Dick (mother)  [phone]      [facility]/Clinician location: [place], [place]    Visit Date: 2/22/2022   Time of appointment:  9:02a to 9:45p   Time spent with Patient: 43 minutes. This is patient's fifth appointment. Reason for Consult:  Anxiety and Depression     Referring Provider/PCP:    No ref. provider found  DORIS Frankel NP      Pt provided informed consent for the behavioral health program. Discussed with patient model of service to include the limits of confidentiality (i.e. abuse reporting, suicide intervention, etc.) and short-term intervention focused approach. Pt indicated understanding. Florentin Cai is a 42 y.o. male who presents for follow up of depression and anxiety. This session consisted of discussing attempts at last week's goals, barriers, and desired goals. Froylan Lin reported interest in incorporating more behavioral work into therapy and discussed his daily routine and desired changes with [facility]. [facility] and Froylan Lin discussed thoughts/feelings that have become barriers to completing such goals/changes in the past. [facility] and Froylan Lin worked together to identify viable first increment of his desired activity goal.     Previous Recommendations:   1.  Froylan Lin will continue reading ACT materials (Happiness Cyrus) at his own pace. 2. Sixto Lugo will explore self-compassion website and resources and note feelings, thoughts, and behavioral reactions to the material.  3. Sixto Lugo will implement defusion tool for evening self-critical thoughts as discussed in session Kailyn Marina. 4. Follow up in 2 weeks, 2/22 via [facility]. MENTAL STATUS EXAM  Mood was decreased range and euthymic with congruent affect. Suicidal ideation was denied. Homicidal ideation was denied. Hygiene was [virtual, unable to assess]. Dress was appropriate. Behavior was Within Normal Limits with No observation or self-report of difficulties ambulating. Attitude was Cooperative, [place] and Friendly. Eye-contact was good. Speech: rate - WNL, rhythm - WNL, volume - WNL and monotone. Verbalizations were goal directed and coherent. Thought processes were intact and goal-oriented without evidence of delusions, hallucinations, obsessions, or mahesh; with little cognitive distortions. Associations were characterized by intact cognitive processes. Pt was oriented to person, place, time, and general circumstances;  recent:  good. Insight and judgment were estimated to be good, AEB, a good  understanding of cyclical maladaptive patterns, and the ability to use insight to inform behavior change. ASSESSMENT  Isabel Shukla presented to the appointment today for evaluation and treatment of symptoms of anxiety and depression. He is currently deemed no risk to himself or others and meets criteria for Persistent Depressive Disorder w/ anxious distress. Sixto Lugo reported continued practice of defusion and other coping skills developed in session/via reading ACT materials outside of session. He also reported no thoughts of death or dying in the past week. He was receptive to discussing barriers to engaging with certain goals and reframing maladaptive thoughts as appropriate.  [facility] and Sixto Lugo worked together to construct appropriate goals for next 3/1.      INTERACTIVE COMPLEXITY  Is interactive complexity present?   No  Reason:  N/A  Additional Supporting Information:  N/A       Electronically signed by Roland Engle on 6/2/96 at 9:00 AM EST

## 2022-03-01 ENCOUNTER — TELEMEDICINE (OUTPATIENT)
Dept: BEHAVIORAL/MENTAL HEALTH CLINIC | Age: 47
End: 2022-03-01
Payer: MEDICAID

## 2022-03-01 DIAGNOSIS — F90.9 ATTENTION DEFICIT HYPERACTIVITY DISORDER (ADHD), UNSPECIFIED ADHD TYPE: ICD-10-CM

## 2022-03-01 DIAGNOSIS — F34.1 PERSISTENT DEPRESSIVE DISORDER: Primary | ICD-10-CM

## 2022-03-01 PROCEDURE — 1036F TOBACCO NON-USER: CPT | Performed by: PSYCHOLOGIST

## 2022-03-01 PROCEDURE — 90834 PSYTX W PT 45 MINUTES: CPT | Performed by: PSYCHOLOGIST

## 2022-03-01 RX ORDER — METHYLPHENIDATE HYDROCHLORIDE 36 MG/1
72 TABLET ORAL EVERY MORNING
Qty: 60 TABLET | Refills: 0 | Status: SHIPPED | OUTPATIENT
Start: 2022-03-01 | End: 2022-03-30 | Stop reason: SDUPTHER

## 2022-03-01 NOTE — PROGRESS NOTES
ADULT BEHAVIORAL HEALTH FOLLOW UP  MYLES Car M.A. Psychology Doctoral Trainee    Supervising Clinical Psychologists:  Wyman Essex, Ph.D. Lai Barnett,  Ph.D. Jeffrey Delgado Psy.D. [place] License [id number]    Pt provided verbal consent to engage in telehealth psychotherapy. This visit was completed virtually using [employer]. due to contact restrictions related to the COVID-19 pandemic. Session was held in a private place: pt's office. Due to the virtual delivery of services, certain behavioral observations were not assessed during this appointment. Pt provided verbal consent for the following emergency contact:  Claudio Ceballos (mother)  [phone]      [facility]/Clinician location: [facility]    Visit Date: 3/1/2022   Time of appointment: 11:01a to 11:45a  Time spent with Patient: 44 minutes. This is patient's sixth appointment. Reason for Consult:  Anxiety and Depression     Referring Provider/PCP:    No ref. provider found  DORIS Blakely NP      Pt provided informed consent for the behavioral health program. Discussed with patient model of service to include the limits of confidentiality (i.e. abuse reporting, suicide intervention, etc.) and short-term intervention focused approach. Pt indicated understanding. Peg Askew is a 42 y.o. male who presents for follow up of depression and anxiety. This session Marquez Joseph reported the results of last week's behavioral goal of adding activity to his routine. Additionally, he reported positive social experiences in the past week concurrent with improved mood. We discussed goals and plans that Marquez Joseph wants to pursue in the interest of improving his overall life satisfaction. Previous Recommendations:   Marquez Joseph will incorporate walking (down to basement) into his routine at least 2x between session and note thoughts and feelings that arise. Follow up in one week 3/1.     MENTAL STATUS EXAM  Mood was decreased range and euthymic with congruent affect. Suicidal ideation was denied. Homicidal ideation was denied. Hygiene was [virtual, unable to assess]. Dress was appropriate. Behavior was Within Normal Limits with No observation or self-report of difficulties ambulating. Attitude was Cooperative, [place] and Friendly. Eye-contact was good. Speech: rate - WNL, rhythm - WNL, volume - WNL and monotone. Verbalizations were goal directed and coherent. Thought processes were intact and goal-oriented without evidence of delusions, hallucinations, obsessions, or mahesh; with little cognitive distortions. Associations were characterized by intact cognitive processes. Pt was oriented to person, place, time, and general circumstances;  recent:  good. Insight and judgment were estimated to be good, AEB, a good  understanding of cyclical maladaptive patterns, and the ability to use insight to inform behavior change. ASSESSMENT  Aisha Carmichael presented to the appointment today for evaluation and treatment of symptoms of anxiety and depression. He is currently deemed no risk to himself or others and meets criteria for Persistent Depressive Disorder w/ anxious distress. Melisa Cruz reported achieving and exceeding his activity goal from the previous week, with minimal anxiety or resistance. He also reported no thoughts of death or dying in the past week. [facility] and Melisa Cruz worked together to determine additional domains to incorporate into behavioral activation. [facility] recommended continuing with an incremental steps approach as well as shifting to a positive/additive framework rather than a deficit approach to living with depression. Overall, Melisa Cruz was in agreement with recommendations.       PHQ Scores 1/20/2022 1/15/2022 11/3/2021   PHQ2 Score 6 6 0   PHQ9 Score 22 24 0     Interpretation of Total Score Depression Severity: 1-4 = Minimal depression, 5-9 = Mild depression, 10-14 = Moderate depression, 15-19 = Moderately severe depression, 20-27 = Severe depression    How often pt has had thoughts of death or hurting self (if PHQ positive for depression):       No flowsheet data found. Interpretation of BARTOLOME-7 score: 5-9 = mild anxiety, 10-14 = moderate anxiety, 15+ = severe anxiety. Recommend referral to behavioral health for scores 10 or greater. DIAGNOSIS  Maria Guadalupe Landaverde was seen today for anxiety and depression. Diagnoses and all orders for this visit:    Persistent depressive disorder          INTERVENTION  Discussed various factors related to the development and maintenance of  depression (including biological, cognitive, behavioral, and environmental factors), Trained in strategies for increasing balanced thinking, Provided education, Motivational Interviewing to increase patient confidence and compliance with adhering to behavioral change plan, Motivational Interviewing to determine importance and readiness for change, Discussed potential barriers to change, Established rapport, Conducted functional assessment, [place]-setting to identify pt's primary goals for [facility] visit / overall health, Supportive techniques, Provided Psychoeducation re: acceptance and defusion, Collaborative treatment planning,Clarified role of [facility] in primary care,Recommended that pt establish with a mental health clinician with whom they can meet regularly for psychotherapy services and Reviewed options for identifying appropriate providers      PLAN  1. Maria Guadalupe Landaverde will report engaging in physical activity of his choosing more than half the days between sessions and note concurrent changes in feeling, thought and behavior  2. Maria Guadalupe Landaverde will begin researching online degree programs he wants to pursue and note feelings, thoughts, and behavior in the process. 3. Follow up in two weeks, 3/15 via [employer]. INTERACTIVE COMPLEXITY  Is interactive complexity present?   No  Reason:  N/A  Additional Supporting Information:  N/A Electronically signed by Roland Engle on 6/6/99 at 12:00 PM EST

## 2022-03-05 ENCOUNTER — PATIENT MESSAGE (OUTPATIENT)
Dept: FAMILY MEDICINE CLINIC | Age: 47
End: 2022-03-05

## 2022-03-05 DIAGNOSIS — I10 ESSENTIAL HYPERTENSION: ICD-10-CM

## 2022-03-05 DIAGNOSIS — G47.00 INSOMNIA, UNSPECIFIED TYPE: ICD-10-CM

## 2022-03-05 DIAGNOSIS — K21.9 GASTROESOPHAGEAL REFLUX DISEASE WITHOUT ESOPHAGITIS: ICD-10-CM

## 2022-03-07 RX ORDER — OMEPRAZOLE 20 MG/1
20 CAPSULE, DELAYED RELEASE ORAL 2 TIMES DAILY
Qty: 90 CAPSULE | Refills: 0 | Status: SHIPPED | OUTPATIENT
Start: 2022-03-07 | End: 2022-04-04

## 2022-03-07 RX ORDER — ALPRAZOLAM 0.5 MG/1
TABLET ORAL
Qty: 60 TABLET | Refills: 0 | Status: SHIPPED | OUTPATIENT
Start: 2022-03-07 | End: 2022-04-04 | Stop reason: SDUPTHER

## 2022-03-07 RX ORDER — AMLODIPINE BESYLATE 5 MG/1
5 TABLET ORAL DAILY
Qty: 90 TABLET | Refills: 0 | Status: SHIPPED | OUTPATIENT
Start: 2022-03-07 | End: 2022-06-03

## 2022-03-07 NOTE — TELEPHONE ENCOUNTER
From: Lionel Mitchell  To: Tiffanie Pacheco  Sent: 3/5/2022 8:57 AM EST  Subject: Refills    Hello. I am out of Xanax and just about out of omeprazole and amlodipine. If you could send over prescriptions today I would really appreciate it. My usual pharmacy is closed today. Could you please send them to the [place] [id number] in [facility]. Their number is [phone], if that helps.  Thank you    Bobbi Puls

## 2022-03-15 ENCOUNTER — TELEMEDICINE (OUTPATIENT)
Dept: BEHAVIORAL/MENTAL HEALTH CLINIC | Age: 47
End: 2022-03-15
Payer: MEDICAID

## 2022-03-15 DIAGNOSIS — F34.1 PERSISTENT DEPRESSIVE DISORDER: Primary | ICD-10-CM

## 2022-03-15 PROCEDURE — 90834 PSYTX W PT 45 MINUTES: CPT | Performed by: PSYCHOLOGIST

## 2022-03-15 PROCEDURE — 1036F TOBACCO NON-USER: CPT | Performed by: PSYCHOLOGIST

## 2022-03-15 NOTE — PROGRESS NOTES
ADULT BEHAVIORAL HEALTH FOLLOW UP  MYLES Dale M.A. Psychology Doctoral Trainee    Supervising Clinical Psychologists:  Levi Mckay, Ph.D. Paola Mcmahan,  Ph.D. Cyrus Kelly Psy.D. [place] License [id number]    Pt provided verbal consent to engage in telehealth psychotherapy. This visit was completed virtually using [employer]. due to contact restrictions related to the COVID-19 pandemic. Session was held in a private place: pt's office. Due to the virtual delivery of services, certain behavioral observations were not assessed during this appointment. Pt provided verbal consent for the following emergency contact:  Melissa Edward (mother)  [phone]      [facility]/Clinician location: [place], [place]    Visit Date: 3/15/2022   Time of appointment: 10:01a to 10:48a  Time spent with Patient: 47 minutes. This is patient's seventh appointment. Reason for Consult:  Depression and Anxiety     Referring Provider/PCP:    No ref. provider found  Jasen Nunoal, APRN - NP      Pt provided informed consent for the behavioral health program. Discussed with patient model of service to include the limits of confidentiality (i.e. abuse reporting, suicide intervention, etc.) and short-term intervention focused approach. Pt indicated understanding. Adeel Ventura is a 42 y.o. male who presents for follow up of depression and anxiety. This session consisted of reviewing behavioral goals and outcomes, discussing new areas for behavior change, and discussing process of finding a new therapist for follow up care after May. Nicolleshazia Bermudez reported success with increasing physical activity to include more walking and resistance exercises and concurrent improvement in mood. Nicolle Bermudez also discussed the effects of news on his mental health.  He worked with therapist to reframe maladaptive thoughts around watching/reading news in service of a new behavior change goal (reduce engagement with news). [facility] also provided referral sources for Charles Guzmán to call and ensured Charles Guzmán was scheduled for biweekly follow up through end of [facility] schedule. Previous Recommendations:   1. Charles Guzmán will report engaging in physical activity of his choosing more than half the days between sessions and note concurrent changes in feeling, thought and behavior  2. Charles Guzmán will begin researching online degree programs he wants to pursue and note feelings, thoughts, and behavior in the process. 3. Follow up in two weeks, 3/15 via [employer]. MENTAL STATUS EXAM  Mood was decreased range and euthymic with congruent affect. Suicidal ideation was denied. Homicidal ideation was denied. Hygiene was [virtual, unable to assess]. Dress was appropriate. Behavior was Within Normal Limits with No observation or self-report of difficulties ambulating. Attitude was Cooperative, [place] and Friendly. Eye-contact was good. Speech: rate - WNL, rhythm - WNL, volume - WNL and monotone. Verbalizations were goal directed and coherent. Thought processes were intact and goal-oriented without evidence of delusions, hallucinations, obsessions, or mahesh; with little cognitive distortions. Associations were characterized by intact cognitive processes. Pt was oriented to person, place, time, and general circumstances;  recent:  good. Insight and judgment were estimated to be good, AEB, a good  understanding of cyclical maladaptive patterns, and the ability to use insight to inform behavior change. ASSESSMENT  Gilberto Gonzalez presented to the appointment today for evaluation and treatment of symptoms of anxiety and depression. He is currently deemed no risk to himself or others and meets criteria for Persistent Depressive Disorder w/ anxious distress. Charles Guzmán reported achieving and exceeding his activity goals to include walking and resistance training, with overall positive effect on mood.  He also reported continued minimal to no thoughts of death/dying. [facility] recommended continuing with behavior change steps, including new domains of online coursework and changing news consumption patterns. [facility] also recommended beginning process of finding new therapist for follow up care after May Overall, Mirza Parra was in agreement with recommendations, set goals accordingly, and set follow up appointments through end of [facility] schedule. PHQ Scores 1/20/2022 1/15/2022 11/3/2021   PHQ2 Score 6 6 0   PHQ9 Score 22 24 0     Interpretation of Total Score Depression Severity: 1-4 = Minimal depression, 5-9 = Mild depression, 10-14 = Moderate depression, 15-19 = Moderately severe depression, 20-27 = Severe depression    How often pt has had thoughts of death or hurting self (if PHQ positive for depression):       No flowsheet data found. Interpretation of BARTOLOME-7 score: 5-9 = mild anxiety, 10-14 = moderate anxiety, 15+ = severe anxiety. Recommend referral to behavioral health for scores 10 or greater. DIAGNOSIS  Mirza Parra was seen today for depression and anxiety.     Diagnoses and all orders for this visit:    Persistent depressive disorder          INTERVENTION  Discussed various factors related to the development and maintenance of  depression (including biological, cognitive, behavioral, and environmental factors), Trained in strategies for increasing balanced thinking, Provided education, Motivational Interviewing to increase patient confidence and compliance with adhering to behavioral change plan, Motivational Interviewing to determine importance and readiness for change, Discussed potential barriers to change, Established rapport, Conducted functional assessment, [place]-setting to identify pt's primary goals for [facility] visit / overall health, Supportive techniques, Provided Psychoeducation re: acceptance and defusion, Collaborative treatment planning,Clarified role of [facility] in primary care,Recommended that pt establish with a mental health clinician with whom they can meet regularly for psychotherapy services and Reviewed options for identifying appropriate providers      PLAN  1. Rakan Vicente will pivot from TV news to exercise at least twice in the next two weeks   2. Arkourtney Vicente will add in at least one more day of exercise in the next two weeks  3. Ardyth Vicente will note how much time is spent perusing news sites in his everyday routine. 4. Follow up in two weeks via [employer]. Pt confirmed biweekly follow up appointments through end of [facility]'s time at [facility] in May. [facility] will follow up on referrals provided for follow up care. INTERACTIVE COMPLEXITY  Is interactive complexity present?   No  Reason:  N/A  Additional Supporting Information:  N/A       Electronically signed by Esme Velez on 0/58/98 at 12:00 PM EST

## 2022-03-21 DIAGNOSIS — F25.1 SCHIZOAFFECTIVE DISORDER, DEPRESSIVE TYPE (HCC): ICD-10-CM

## 2022-03-21 RX ORDER — FLUOXETINE HYDROCHLORIDE 20 MG/1
60 CAPSULE ORAL DAILY
Qty: 270 CAPSULE | Refills: 1 | Status: SHIPPED | OUTPATIENT
Start: 2022-03-21 | End: 2022-04-25 | Stop reason: SDUPTHER

## 2022-03-21 NOTE — TELEPHONE ENCOUNTER
Please Approve or Refuse.   Send to Pharmacy per Pt's Request:     Next Visit Date:  5/9/2022   Last Visit Date: 2/7/2022    No results found for: LABA1C          ( goal A1C is < 7)   BP Readings from Last 3 Encounters:   11/03/21 120/82          (goal 120/80)  No results found for: BUN  No results found for: CREATININE  No results found for: K

## 2022-03-29 ENCOUNTER — CLINICAL DOCUMENTATION (OUTPATIENT)
Dept: BEHAVIORAL/MENTAL HEALTH CLINIC | Age: 47
End: 2022-03-29

## 2022-03-29 ENCOUNTER — TELEPHONE (OUTPATIENT)
Dept: BEHAVIORAL/MENTAL HEALTH CLINIC | Age: 47
End: 2022-03-29

## 2022-03-29 NOTE — TELEPHONE ENCOUNTER
.Contacted patient today in regards to missed appointment. Pt answered    Was call completed? If not, reason: Call was completed    Does patient want to continue services? If no, and the patient is a new patient, please close the referral. Also, if no, please document reason and route message to provider. Yes    Anything the provider should be aware of? If yes, please route call.  No    Reason for Missed Appointment: Unknown

## 2022-03-30 DIAGNOSIS — F90.9 ATTENTION DEFICIT HYPERACTIVITY DISORDER (ADHD), UNSPECIFIED ADHD TYPE: ICD-10-CM

## 2022-03-30 RX ORDER — METHYLPHENIDATE HYDROCHLORIDE 36 MG/1
72 TABLET ORAL EVERY MORNING
Qty: 60 TABLET | Refills: 0 | Status: SHIPPED | OUTPATIENT
Start: 2022-03-30 | End: 2022-04-28 | Stop reason: SDUPTHER

## 2022-03-30 NOTE — TELEPHONE ENCOUNTER
Kip Standard APRN-CNP  [facility]  [address], [address]  [address] [address]  Dept: [phone]  Dept Fax: [phone]    Requested Prescriptions     Pending Prescriptions Disp Refills    methylphenidate (CONCERTA) 36 MG extended release tablet 60 tablet 0     Sig: Take 2 tablets by mouth every morning for 30 days. Telephone Refill Encounter    - Covering for sierra templeton, as she is out of the office.  - Medical history, allergies, and current medication list reviewed. - Patient name and location verified by patient. - Verbal consent obtained to refill medication through a remote evaluation.  - Advised to follow up with PCP as directed. - Chart available to PCP through [employer] for review. Attention deficit hyperactivity disorder (ADHD), unspecified ADHD type  - methylphenidate (CONCERTA) 36 MG extended release tablet; Take 2 tablets by mouth every morning for 30 days. Dispense: 60 tablet;  Refill: 0      Past Medical History:   Diagnosis Date    Acid reflux     ADHD (attention deficit hyperactivity disorder)     Anxiety     Depression     Insomnia       CURRENT MEDS W/ ASSOC DIAG         Start Date End Date     ALPRAZolam (XANAX) 0.5 MG tablet  03/07/22 04/07/22     TAKE ONE TO TWO TABLETS BY MOUTH AT BEDTIME AS NEEDED FOR SLEEP     Associated Diagnoses:  Insomnia, unspecified type     amLODIPine (NORVASC) 5 MG tablet  03/07/22  --     Take 1 tablet by mouth daily     Associated Diagnoses:  Essential hypertension     ARIPiprazole (ABILIFY) 15 MG tablet  01/03/22 04/03/22     Take 1 tablet by mouth daily     Associated Diagnoses:  Schizoaffective disorder, depressive type (HCC)     FLUoxetine (PROZAC) 20 MG capsule  03/21/22 06/19/22     Take 3 capsules by mouth daily Take 3 capsules by mouth daily     Associated Diagnoses:  Schizoaffective disorder, depressive type (HCC)     methylphenidate (CONCERTA) 36 MG extended release tablet  03/01/22 03/31/22     Take 2 tablets by mouth every morning for 30 days.      Associated Diagnoses:  Attention deficit hyperactivity disorder (ADHD), unspecified ADHD type     omeprazole (PRILOSEC) 20 MG delayed release capsule  03/07/22  --     Take 1 capsule by mouth 2 times daily     Associated Diagnoses:  Gastroesophageal reflux disease without esophagitis     traZODone (DESYREL) 100 MG tablet  02/07/22  --     Take 1 tablet by mouth nightly Take 2 tablets by mouth every night     Associated Diagnoses:  --        DORIS Tracey-CNP

## 2022-04-04 DIAGNOSIS — K21.9 GASTROESOPHAGEAL REFLUX DISEASE WITHOUT ESOPHAGITIS: ICD-10-CM

## 2022-04-04 DIAGNOSIS — G47.00 INSOMNIA, UNSPECIFIED TYPE: ICD-10-CM

## 2022-04-04 RX ORDER — OMEPRAZOLE 20 MG/1
CAPSULE, DELAYED RELEASE ORAL
Qty: 90 CAPSULE | Refills: 0 | Status: SHIPPED | OUTPATIENT
Start: 2022-04-04 | End: 2022-05-09 | Stop reason: SDUPTHER

## 2022-04-04 RX ORDER — ALPRAZOLAM 0.5 MG/1
TABLET ORAL
Qty: 60 TABLET | Refills: 0 | Status: SHIPPED | OUTPATIENT
Start: 2022-04-04 | End: 2022-05-04 | Stop reason: SDUPTHER

## 2022-04-12 ENCOUNTER — TELEMEDICINE (OUTPATIENT)
Dept: BEHAVIORAL/MENTAL HEALTH CLINIC | Age: 47
End: 2022-04-12
Payer: MEDICAID

## 2022-04-12 DIAGNOSIS — F34.1 PERSISTENT DEPRESSIVE DISORDER: Primary | ICD-10-CM

## 2022-04-12 PROCEDURE — 90834 PSYTX W PT 45 MINUTES: CPT | Performed by: PSYCHOLOGIST

## 2022-04-12 PROCEDURE — 1036F TOBACCO NON-USER: CPT | Performed by: PSYCHOLOGIST

## 2022-04-12 NOTE — PROGRESS NOTES
ADULT BEHAVIORAL HEALTH FOLLOW UP  MYLES Diehl M.A. Psychology Doctoral Trainee    Supervising Clinical Psychologists:  Bonnie [place], Ph.D. Natalia Oneal,  Ph.D. Angella Fink Psy.D. [place] License [id number]    Pt provided verbal consent to engage in telehealth psychotherapy. This visit was completed virtually using [employer]. due to contact restrictions related to the COVID-19 pandemic. Session was held in a private place: pt's office. Due to the virtual delivery of services, certain behavioral observations were not assessed during this appointment. Pt provided verbal consent for the following emergency contact:  Kathy Cosme (mother)  [phone]      [facility]/Clinician location: [facility], [place], [place]    Visit Date: 4/12/2022   Time of appointment: 10:01a to 10:52 a  Time spent with Patient: 51 minutes. This is patient's eighth appointment. Reason for Consult:  Anxiety and Depression     Referring Provider/PCP:    No ref. provider found  DORIS Spear NP      Pt provided informed consent for the behavioral health program. Discussed with patient model of service to include the limits of confidentiality (i.e. abuse reporting, suicide intervention, etc.) and short-term intervention focused approach. Pt indicated understanding. Greg Acosta is a 42 y.o. male who presents for follow up of depression and anxiety. This session consisted of reviewing progress in brief intervention thus far and discussing follow up care plans for when therapist leaves in May. Artemio Telles indicated that overall he still struggles with depressed cognitions and corresponding behavioral restrictions. He indicated frustration with the lack of change in his experience over the years.  Therapist worked with Artemio Telles to reinforce behavior changes he has made so far in these sessions with the goal of helping him believe in his ability to continue making changes, however small. [facility] and Rhonda Franklin also identified follow up care options that take his insurance. Rhonda Franklin called and secured a first session at [facility] during session. Rhonda Fongprashant and [facility] set follow up behavior goals. Previous Recommendations:   1. hRonda Franklin will pivot from TV news to exercise at least twice in the next two weeks   2. Rhonda Franklin will add in at least one more day of exercise in the next two weeks  3. Rhonda Franklin will note how much time is spent perusing news sites in his everyday routine. 4. Follow up in two weeks via MyChart. Pt confirmed biweekly follow up appointments through end of [facility]'s time at [facility] in May. [facility] will follow up on referrals provided for follow up care. MENTAL STATUS EXAM  Mood was decreased range and euthymic with congruent affect. Suicidal ideation was denied. Homicidal ideation was denied. Hygiene was [virtual, unable to assess]. Dress was appropriate. Behavior was Within Normal Limits with No observation or self-report of difficulties ambulating. Attitude was Cooperative, [place] and Friendly. Eye-contact was good. Speech: rate - WNL, rhythm - WNL, volume - WNL and monotone. Verbalizations were goal directed and coherent. Thought processes were intact and goal-oriented without evidence of delusions, hallucinations, obsessions, or mahesh; with little cognitive distortions. Associations were characterized by intact cognitive processes. Pt was oriented to person, place, time, and general circumstances;  recent:  good. Insight and judgment were estimated to be good, AEB, a good  understanding of cyclical maladaptive patterns, and the ability to use insight to inform behavior change. ASSESSMENT  Cb Reardon presented to the appointment today for evaluation and treatment of symptoms of anxiety and depression. He is currently deemed no risk to himself or others and meets criteria for Persistent Depressive Disorder w/ anxious distress.  Rhonda Franklin reported increased distress with changes to a family member's health and overall despondency around chronic mental health conditions over the past few weeks. [facility] and Parminder Mcneil worked together to reframe maladaptive thoughts and develop reasonable goals for follow up sessions until [facility] leaves. [facility] also recommended securing next therapist as soon as possible, with the goal of having biweekly or weekly sessions long term. Parminder Coollarry was in agreement with recommendations, set goals accordingly, and secured an intake appointment with a clinician at the [facility] for June 1st.      [facility] Scores 1/20/2022 1/15/2022 11/3/2021   PHQ2 Score 6 6 0   PHQ9 Score 22 24 0     Interpretation of Total Score Depression Severity: 1-4 = Minimal depression, 5-9 = Mild depression, 10-14 = Moderate depression, 15-19 = Moderately severe depression, 20-27 = Severe depression    How often pt has had thoughts of death or hurting self (if PHQ positive for depression):       No flowsheet data found. Interpretation of BARTOLOME-7 score: 5-9 = mild anxiety, 10-14 = moderate anxiety, 15+ = severe anxiety. Recommend referral to behavioral health for scores 10 or greater. DIAGNOSIS  Parminder Mcneil was seen today for anxiety and depression.     Diagnoses and all orders for this visit:    Persistent depressive disorder          INTERVENTION  Discussed various factors related to the development and maintenance of  depression (including biological, cognitive, behavioral, and environmental factors), Trained in strategies for increasing balanced thinking, Provided education, Motivational Interviewing to increase patient confidence and compliance with adhering to behavioral change plan, Motivational Interviewing to determine importance and readiness for change, Discussed potential barriers to change, Established rapport, Conducted functional assessment, [place]-setting to identify pt's primary goals for [facility] visit / overall health, Supportive techniques, Provided Psychoeducation re: acceptance and defusion, Collaborative treatment planning,Clarified role of [facility] in primary care,Recommended that pt establish with a mental health clinician with whom they can meet regularly for psychotherapy services and Reviewed options for identifying appropriate providers      PLAN  1. Arias Rendon will record hygiene behaviors over the next two weeks  2. Arias Rendon will add one more hygiene behavior per week for the next two weeks. 3. Follow up 4/26 via [employer]. We will discuss the results of the above and continue summarizing insights, skills from current therapeutic relationship to be highlighted as useful with next therapist.       Damari Zhou  Is interactive complexity present?   No  Reason:  N/A  Additional Supporting Information:  N/A       Electronically signed by Samanta Santiago on 4/32/60 at 12:00 PM EST

## 2022-04-25 DIAGNOSIS — F25.1 SCHIZOAFFECTIVE DISORDER, DEPRESSIVE TYPE (HCC): ICD-10-CM

## 2022-04-25 RX ORDER — TRAZODONE HYDROCHLORIDE 100 MG/1
100 TABLET ORAL NIGHTLY
Qty: 180 TABLET | Refills: 1 | Status: SHIPPED | OUTPATIENT
Start: 2022-04-25 | End: 2022-06-22 | Stop reason: SDUPTHER

## 2022-04-25 RX ORDER — FLUOXETINE HYDROCHLORIDE 20 MG/1
60 CAPSULE ORAL DAILY
Qty: 270 CAPSULE | Refills: 1 | Status: SHIPPED | OUTPATIENT
Start: 2022-04-25 | End: 2022-07-24

## 2022-04-26 ENCOUNTER — TELEMEDICINE (OUTPATIENT)
Dept: BEHAVIORAL/MENTAL HEALTH CLINIC | Age: 47
End: 2022-04-26
Payer: MEDICAID

## 2022-04-26 DIAGNOSIS — F34.1 PERSISTENT DEPRESSIVE DISORDER: Primary | ICD-10-CM

## 2022-04-26 PROCEDURE — 90834 PSYTX W PT 45 MINUTES: CPT | Performed by: PSYCHOLOGIST

## 2022-04-26 PROCEDURE — 1036F TOBACCO NON-USER: CPT | Performed by: PSYCHOLOGIST

## 2022-04-26 NOTE — PROGRESS NOTES
ADULT BEHAVIORAL HEALTH FOLLOW UP  MYLES Ayala M.A. Psychology Doctoral Trainee    Supervising Clinical Psychologists:  Roberto Crews, Ph.D. Gustave Duverney,  Ph.D. Tim Dhaliwal Psy.D. [place] License [id number]    Pt provided verbal consent to engage in telehealth psychotherapy. This visit was completed virtually using [employer]. due to contact restrictions related to the COVID-19 pandemic. Session was held in a private place: pt's office. Due to the virtual delivery of services, certain behavioral observations were not assessed during this appointment. Pt provided verbal consent for the following emergency contact:  Zak Langston (mother)  [phone]      [facility]/Clinician location: [address]    Visit Date: 4/26/2022   Time of appointment: 10:02a to 10:52a  Time spent with Patient: 50 minutes. This is patient's ninth appointment. Reason for Consult:  Depression and Anxiety     Referring Provider/PCP:    No ref. provider found  DORIS Morris NP      Pt provided informed consent for the behavioral health program. Discussed with patient model of service to include the limits of confidentiality (i.e. abuse reporting, suicide intervention, etc.) and short-term intervention focused approach. Pt indicated understanding. Jose Alberto Machuca is a 42 y.o. male who presents for follow up of depression and anxiety. This session consisted of discussing changes in mood and activity in the last two weeks and processing experience and outcome of last session's goals. Marty Pope and therapist also discussed the value of process (vs. outcome) orientation in life and in treatment, as Marty Pope noted he is frequently \"stuck\" in the thought that he is not learning or doing enough. Marty Pope and therapist identified maladaptive thinking and came up with ways to cue reframing outside of session (I.e. \"something is better than nothing\").  Marty Pope set goals for last session accordingly. Previous Recommendations:   1. [facility] will record hygiene behaviors over the next two weeks  2. [facility] will add one more hygiene behavior per week for the next two weeks. 3. Follow up 4/26 via [employer]. We will discuss the results of the above and continue summarizing insights, skills from current therapeutic relationship to be highlighted as useful with next therapist.     [address]  Mood was decreased range and euthymic with congruent affect. Suicidal ideation was denied. Homicidal ideation was denied. Hygiene was [virtual, unable to assess]. Dress was appropriate. Behavior was Within Normal Limits with No observation or self-report of difficulties ambulating. Attitude was Cooperative, [place] and Friendly. Eye-contact was good. Speech: rate - WNL, rhythm - WNL, volume - WNL and monotone. Verbalizations were goal directed and coherent. Thought processes were intact and goal-oriented without evidence of delusions, hallucinations, obsessions, or mahesh; with little cognitive distortions. Associations were characterized by intact cognitive processes. Pt was oriented to person, place, time, and general circumstances;  recent:  good. Insight and judgment were estimated to be good, AEB, a good  understanding of cyclical maladaptive patterns, and the ability to use insight to inform behavior change. ASSESSMENT  Matt Tabor presented to the appointment today for evaluation and treatment of symptoms of anxiety and depression. He is currently deemed no risk to himself or others and meets criteria for Persistent Depressive Disorder w/ anxious distress. [facility] reported mood elevation and positive behavior change (I.e. social activities) in the last two weeks. [facility] and [facility] worked together to identify factor contributing to this improvement, reframe maladaptive thoughts and summarize a workable approach to treatment and behavioral goals.  [facility] also recommended continuing previous session's process approach to behavioral goals and planning to use next session as a graduation review of insights and accomplishments in this course of treatment. Baldemar Coleman was in agreement with recommendations and set goals accordingly. PHQ Scores 1/20/2022 1/15/2022 11/3/2021   PHQ2 Score 6 6 0   PHQ9 Score 22 24 0     Interpretation of Total Score Depression Severity: 1-4 = Minimal depression, 5-9 = Mild depression, 10-14 = Moderate depression, 15-19 = Moderately severe depression, 20-27 = Severe depression    How often pt has had thoughts of death or hurting self (if PHQ positive for depression):       No flowsheet data found. Interpretation of BARTOLOME-7 score: 5-9 = mild anxiety, 10-14 = moderate anxiety, 15+ = severe anxiety. Recommend referral to behavioral health for scores 10 or greater. DIAGNOSIS  Baldemar Coleman was seen today for depression and anxiety. Diagnoses and all orders for this visit:    Persistent depressive disorder          INTERVENTION  Discussed various factors related to the development and maintenance of  depression (including biological, cognitive, behavioral, and environmental factors), Trained in strategies for increasing balanced thinking, Provided education, Motivational Interviewing to increase patient confidence and compliance with adhering to behavioral change plan, Motivational Interviewing to determine importance and readiness for change, Discussed potential barriers to change, Established rapport, Conducted functional assessment, [place]-setting to identify pt's primary goals for [facility] visit / overall health, Supportive techniques, Provided Psychoeducation re: acceptance and defusion, Collaborative treatment planning,Clarified role of [facility] in primary care,Recommended that pt establish with a mental health clinician with whom they can meet regularly for psychotherapy services and Reviewed options for identifying appropriate providers      PLAN  1.  Baldemar Coleman will be able to describe time spent working towards a goal (programming certification) in the next week. 2. Marty Pope will read more of the Happiness [employer]rap and be able to discuss what he found interesting in his reading. 3. Final follow up will be 5/5. We will review the above and progress in this course of therapy overall. INTERACTIVE COMPLEXITY  Is interactive complexity present?   No  Reason:  N/A  Additional Supporting Information:  N/A       Electronically signed by Addison Kendrick on 7/57/61 at 12:00 PM EST

## 2022-04-28 DIAGNOSIS — F90.9 ATTENTION DEFICIT HYPERACTIVITY DISORDER (ADHD), UNSPECIFIED ADHD TYPE: ICD-10-CM

## 2022-04-28 RX ORDER — METHYLPHENIDATE HYDROCHLORIDE 36 MG/1
72 TABLET ORAL EVERY MORNING
Qty: 60 TABLET | Refills: 0 | Status: SHIPPED | OUTPATIENT
Start: 2022-04-28 | End: 2022-05-30 | Stop reason: SDUPTHER

## 2022-05-04 DIAGNOSIS — G47.00 INSOMNIA, UNSPECIFIED TYPE: ICD-10-CM

## 2022-05-04 RX ORDER — ALPRAZOLAM 0.5 MG/1
TABLET ORAL
Qty: 60 TABLET | Refills: 0 | Status: SHIPPED | OUTPATIENT
Start: 2022-05-04 | End: 2022-06-03

## 2022-05-05 ENCOUNTER — TELEMEDICINE (OUTPATIENT)
Dept: BEHAVIORAL/MENTAL HEALTH CLINIC | Age: 47
End: 2022-05-05
Payer: MEDICAID

## 2022-05-05 DIAGNOSIS — F34.1 PERSISTENT DEPRESSIVE DISORDER: Primary | ICD-10-CM

## 2022-05-05 PROCEDURE — 90834 PSYTX W PT 45 MINUTES: CPT | Performed by: PSYCHOLOGIST

## 2022-05-05 PROCEDURE — 1036F TOBACCO NON-USER: CPT | Performed by: PSYCHOLOGIST

## 2022-05-05 NOTE — PROGRESS NOTES
ADULT BEHAVIORAL HEALTH FOLLOW UP  MYLES Mai M.A. Psychology Doctoral Trainee    Supervising Clinical Psychologists:  Fina Sandhu, Ph.D. Caio Thompson,  Ph.D. Melo Mack Psy.D. [place] License [id number]    Pt provided verbal consent to engage in telehealth psychotherapy. This visit was completed virtually using "[employer]. due to contact restrictions related to the COVID-19 pandemic. Session was held in a private place: pt's office. Due to the virtual delivery of services, certain behavioral observations were not assessed during this appointment. Pt provided verbal consent for the following emergency contact:  Natalia Salmeron (mother)  [phone]      [facility]/Clinician location: [place], [place]    Visit Date: 5/5/2022   Time of appointment: 10:00a to 10:44a  Time spent with Patient: 44 minutes. This is patient's tenth appointment. Reason for Consult:  Anxiety and Depression     Referring Provider/PCP:    No ref. provider found  Donnel Hashimoto, APRN - NP      Pt provided informed consent for the behavioral health program. Discussed with patient model of service to include the limits of confidentiality (i.e. abuse reporting, suicide intervention, etc.) and short-term intervention focused approach. Pt indicated understanding. Ashly Larry is a 42 y.o. male who presents for follow up of depression and anxiety. In this session Parminder Mcneil and therapist reviewed progress in therapy. Parminder Mcneil identified specific factors in treatment that facilitated any improvement and compared this course of treatment to previous. Therapist encouraged Parminder Mcneil to verbalize any specific helpful factors to future therapist to optimize treatment. Parminder Mcneil also described instances of improved mood in the past week. Previous Recommendations:   1. Parminder Mcneil will be able to describe time spent working towards a goal (programming certification) in the next week.   2. Parminder Mcneil will read more of the [employer] and be able to discuss what he found interesting in his reading. 3. Final follow up will be 5/5. We will review the above and progress in this course of therapy overall. MENTAL STATUS EXAM  Mood was decreased range and euthymic with congruent affect. Suicidal ideation was denied. Homicidal ideation was denied. Hygiene was [virtual, unable to assess]. Dress was appropriate. Behavior was Within Normal Limits with No observation or self-report of difficulties ambulating. Attitude was Cooperative, [place] and Friendly. Eye-contact was good. Speech: rate - WNL, rhythm - WNL, volume - WNL and monotone. Verbalizations were goal directed and coherent. Thought processes were intact and goal-oriented without evidence of delusions, hallucinations, obsessions, or mahesh; with little cognitive distortions. Associations were characterized by intact cognitive processes. Pt was oriented to person, place, time, and general circumstances;  recent:  good. Insight and judgment were estimated to be good, AEB, a good  understanding of cyclical maladaptive patterns, and the ability to use insight to inform behavior change. ASSESSMENT  Cb Reardon presented to the appointment today for evaluation and treatment of symptoms of anxiety and depression. He is currently deemed no risk to himself or others and meets criteria for Persistent Depressive Disorder w/ anxious distress. Rhonda Franklin reported mood elevation and positive behavior change overall during course of therapy with current [facility] . [facility] and Rhonda Franklni discussed factors facilitating this. [facility]  recommended communicating these insights to future therapist to aid in treatment. Rhonda Franklin was in agreement with recommendations and confirmed follow up plans.       PHQ Scores 1/20/2022 1/15/2022 11/3/2021   PHQ2 Score 6 6 0   PHQ9 Score 22 24 0     Interpretation of Total Score Depression Severity: 1-4 = Minimal depression, 5-9 = Mild depression, 10-14 = Moderate depression, 15-19 = Moderately severe depression, 20-27 = Severe depression    How often pt has had thoughts of death or hurting self (if PHQ positive for depression):       No flowsheet data found. Interpretation of BARTOLOME-7 score: 5-9 = mild anxiety, 10-14 = moderate anxiety, 15+ = severe anxiety. Recommend referral to behavioral health for scores 10 or greater. DIAGNOSIS  Reji Jenkins was seen today for anxiety and depression. Diagnoses and all orders for this visit:    Persistent depressive disorder          INTERVENTION  Discussed various factors related to the development and maintenance of  depression (including biological, cognitive, behavioral, and environmental factors), Trained in strategies for increasing balanced thinking, Provided education, Motivational Interviewing to increase patient confidence and compliance with adhering to behavioral change plan, Motivational Interviewing to determine importance and readiness for change, Discussed potential barriers to change, Established rapport, Conducted functional assessment, [place]-setting to identify pt's primary goals for [facility] visit / overall health, Supportive techniques, Provided Psychoeducation re: acceptance and defusion, Collaborative treatment planning,Clarified role of [facility] in primary care,Recommended that pt establish with a mental health clinician with whom they can meet regularly for psychotherapy services and Reviewed options for identifying appropriate providers      PLAN  Reji Jenkins will begin therapy at [facility] next month. INTERACTIVE COMPLEXITY  Is interactive complexity present?   No  Reason:  N/A  Additional Supporting Information:  N/A       Electronically signed by Nam Willoughby on 8/02/03 at 12:00 PM EST

## 2022-05-09 ENCOUNTER — OFFICE VISIT (OUTPATIENT)
Dept: FAMILY MEDICINE CLINIC | Age: 47
End: 2022-05-09
Payer: MEDICAID

## 2022-05-09 VITALS
TEMPERATURE: 99.1 F | DIASTOLIC BLOOD PRESSURE: 84 MMHG | HEART RATE: 98 BPM | SYSTOLIC BLOOD PRESSURE: 124 MMHG | BODY MASS INDEX: 33.62 KG/M2 | WEIGHT: 269 LBS | OXYGEN SATURATION: 98 %

## 2022-05-09 DIAGNOSIS — G47.00 INSOMNIA, UNSPECIFIED TYPE: ICD-10-CM

## 2022-05-09 DIAGNOSIS — I10 ESSENTIAL HYPERTENSION: Primary | ICD-10-CM

## 2022-05-09 DIAGNOSIS — F25.1 SCHIZOAFFECTIVE DISORDER, DEPRESSIVE TYPE (HCC): ICD-10-CM

## 2022-05-09 DIAGNOSIS — K21.9 GASTROESOPHAGEAL REFLUX DISEASE WITHOUT ESOPHAGITIS: ICD-10-CM

## 2022-05-09 DIAGNOSIS — Z13.1 SCREENING FOR DIABETES MELLITUS: ICD-10-CM

## 2022-05-09 DIAGNOSIS — F90.9 ATTENTION DEFICIT HYPERACTIVITY DISORDER (ADHD), UNSPECIFIED ADHD TYPE: ICD-10-CM

## 2022-05-09 DIAGNOSIS — Z13.220 SCREENING FOR CHOLESTEROL LEVEL: ICD-10-CM

## 2022-05-09 PROCEDURE — 1036F TOBACCO NON-USER: CPT | Performed by: NURSE PRACTITIONER

## 2022-05-09 PROCEDURE — G8417 CALC BMI ABV UP PARAM F/U: HCPCS | Performed by: NURSE PRACTITIONER

## 2022-05-09 PROCEDURE — 99214 OFFICE O/P EST MOD 30 MIN: CPT | Performed by: NURSE PRACTITIONER

## 2022-05-09 PROCEDURE — G8427 DOCREV CUR MEDS BY ELIG CLIN: HCPCS | Performed by: NURSE PRACTITIONER

## 2022-05-09 RX ORDER — OMEPRAZOLE 20 MG/1
CAPSULE, DELAYED RELEASE ORAL
Qty: 180 CAPSULE | Refills: 1 | Status: SHIPPED | OUTPATIENT
Start: 2022-05-09

## 2022-05-09 ASSESSMENT — ENCOUNTER SYMPTOMS
RHINORRHEA: 0
COUGH: 0
VOMITING: 0
BACK PAIN: 0
DIARRHEA: 0
CONSTIPATION: 0
ABDOMINAL DISTENTION: 0
SORE THROAT: 0
ABDOMINAL PAIN: 0
CHEST TIGHTNESS: 0
SHORTNESS OF BREATH: 0
NAUSEA: 0

## 2022-05-09 NOTE — PROGRESS NOTES
Lang Crigler, APRN-[address]  [id number]  [address] [address]  Dept: [phone]  Dept Fax: [phone]     PATIENT ID: Cb Reardon is a 42 y.o. male. HPI:  Established pt here today for f/u on chronic medical problems; HTN, GERD, ADHD, anxiety, depression, insomnia, go over labs and/or diagnostic studies, and medication refills. Pt denies any fever or chills. Pt today denies any HA, chest pain, or SOB. Pt denies any N/V/D/C or abdominal pain. Pt states mood is stable on meds. Pt states concentration and ability to focus are stable on meds. Pt with occasional heartburn, but stable on meds. Today, patient is doing well on current tx and voices no concerns. My previous office notes, labs and diagnostic studies were reviewed prior to and during encounter. The patient's past medical, surgical, social, and family history as well as current medications and allergies were reviewed as documented in today's encounter by GOLD Meza. Current Outpatient Medications on File Prior to Visit   Medication Sig Dispense Refill    ALPRAZolam (XANAX) 0.5 MG tablet TAKE ONE TO TWO TABLETS BY MOUTH AT BEDTIME AS NEEDED FOR SLEEP 60 tablet 0    methylphenidate (CONCERTA) 36 MG extended release tablet Take 2 tablets by mouth every morning for 30 days. 60 tablet 0    traZODone (DESYREL) 100 MG tablet Take 1 tablet by mouth nightly Take 2 tablets by mouth every night 180 tablet 1    FLUoxetine (PROZAC) 20 MG capsule Take 3 capsules by mouth daily Take 3 capsules by mouth daily 270 capsule 1    amLODIPine (NORVASC) 5 MG tablet Take 1 tablet by mouth daily 90 tablet 0    ARIPiprazole (ABILIFY) 15 MG tablet Take 1 tablet by mouth daily 90 tablet 1     No current facility-administered medications on file prior to visit. SUBJECTIVE:     Review of Systems   Constitutional: Negative for activity change, fatigue and fever. S: Shift review  B: 1 day old , delivered  , formula feeding  A: Stable , tolerating feedings well. Voiding & stooling WDL  R: Continue with normal  cares.    HENT: Negative for congestion, ear pain, rhinorrhea and sore throat. Respiratory: Negative for cough, chest tightness and shortness of breath. Cardiovascular: Negative for chest pain and palpitations. Gastrointestinal: Negative for abdominal distention, abdominal pain, constipation, diarrhea, nausea and vomiting. Heartburn (stable on Omeprazole 20 mg BID)   Endocrine: Negative for polydipsia, polyphagia and polyuria. Genitourinary: Negative for difficulty urinating and dysuria. Musculoskeletal: Negative for arthralgias, back pain and myalgias. Skin: Negative for rash. Neurological: Negative for dizziness, weakness, light-headedness and headaches. Hematological: Negative for adenopathy. Psychiatric/Behavioral: Positive for decreased concentration (stable on Concerta 72 mg daily), dysphoric mood (stable on Abilify 15 mg daily & Prozac 20 mg daily) and sleep disturbance (stable on Xanax 0.5 mg nightly). Negative for agitation and behavioral problems. The patient is not nervous/anxious. OBJECTIVE:  /84   Pulse 98   Temp 99.1 °F (37.3 °C)   Wt 269 lb (122 kg)   SpO2 98%   BMI 33.62 kg/m²     Physical Exam  Vitals and nursing note reviewed. Constitutional:       General: He is not in acute distress. Appearance: Normal appearance. He is well-developed. HENT:      Head: Normocephalic and atraumatic. Cardiovascular:      Rate and Rhythm: Normal rate and regular rhythm. Heart sounds: Normal heart sounds. No murmur heard. Pulmonary:      Effort: Pulmonary effort is normal. No respiratory distress. Breath sounds: Normal breath sounds. Chest:      Chest wall: No tenderness. Abdominal:      General: Bowel sounds are normal.      Palpations: Abdomen is soft. Tenderness: There is no abdominal tenderness. Musculoskeletal:         General: Normal range of motion. Cervical back: Normal range of motion. Right lower leg: No edema.       Left lower leg: No edema. Skin:     General: Skin is warm and dry. Findings: No rash. Neurological:      Mental Status: He is alert and oriented to person, place, and time. Psychiatric:         Mood and Affect: Mood normal.         Behavior: Behavior is cooperative. ASSESSMENT:   Diagnosis Orders   1. Essential hypertension  CBC    Comprehensive Metabolic Panel    Lipid Panel   2. Gastroesophageal reflux disease without esophagitis  omeprazole (PRILOSEC) 20 MG delayed release capsule   3. Attention deficit hyperactivity disorder (ADHD), unspecified ADHD type     4. Schizoaffective disorder, depressive type ([facility] [address]     5. Insomnia, unspecified type     6. Screening for diabetes mellitus  Hemoglobin A1C   7. Screening for cholesterol level  CBC    Comprehensive Metabolic Panel    Lipid Panel     PLAN:  1. Essential hypertension  - Stable: Medication re-filled as needed, con't medications as prescribed, con't current tx plan  - BP in office today noted; 120/82  - Continue Amlodipine as prescribed by previous provider.       2. Gastroesophageal reflux disease without esophagitis  - Stable: Medication re-filled as needed, con't medications as prescribed, con't current tx plan  - Continue Omeprazole 20 mg BID as prescribed by previous provider.  - Patient educated on avoiding trigger food/drinks such as caffeine, soda, chocolate, greasy/fatty, spicy foods.  - Sleep with head of bed elevated, avoid lying flat after eating and avoid restrictive clothing around waist.    3. Attention deficit hyperactivity disorder (ADHD), unspecified ADHD type  - Stable: Medication re-filled as needed, con't medications as prescribed, con't current tx plan  - Patient admits to making careless mistakes/lacks attention to detail, has difficulty sustaining attention but feels this is much improved by the prescribed medication   - Continue Concerta 72 mg daily as prescribed by previous provider.  - OARRS report was assessed during today's visit.   - Patient was found to be compliant with narcotic prescription policies   - Will continue to follow every 3 months    4. Schizoaffective disorder, depressive type ([facility] [address]  - Stable: Medication re-filled as needed, con't medications as prescribed, con't current tx plan  - Continue with Abilify 15 mg daily as prescribed by previous provider.   - Continue with Prozac 20 mg daily as prescribed by previous provider.     - Offered reassurance, allowed to ventilate feelings and ask questions; non-pharmological coping methods discussed. 5. Insomnia, unspecified type  - Stable: Medication re-filled as needed, con't medications as prescribed, con't current tx plan  - Continue with Trazodone 100 mg nightly as prescribed by previous provider.   - Continue with Xanax 0.5 mg as prescribed by previous provider.  - We did discuss cutting his dosage down at his next refill to begin weaning him off.      - Discussed sleep hygiene, limiting caffeine after noon and decreasing naps during the day. - Rest of systems unchanged, continue current treatments. - On this date May 9, 2022,  I have spent greater than 50% of this visit reviewing previous notes, test results and/or face to face with the patient discussing the diagnoses, importance of compliance with the treatment plan, counseling, coordinating care as well as documenting on the day of the visit.      DORIS Persaud-CNP

## 2022-05-30 DIAGNOSIS — F90.9 ATTENTION DEFICIT HYPERACTIVITY DISORDER (ADHD), UNSPECIFIED ADHD TYPE: ICD-10-CM

## 2022-05-31 RX ORDER — METHYLPHENIDATE HYDROCHLORIDE 36 MG/1
72 TABLET ORAL EVERY MORNING
Qty: 60 TABLET | Refills: 0 | Status: SHIPPED | OUTPATIENT
Start: 2022-05-31 | End: 2022-06-29 | Stop reason: SDUPTHER

## 2022-05-31 NOTE — TELEPHONE ENCOUNTER
Please Approve or Refuse.   Send to Pharmacy per Pt's Request:      Next Visit Date:  8/9/2022   Last Visit Date: 5/9/2022    No results found for: LABA1C          ( goal A1C is < 7)   BP Readings from Last 3 Encounters:   05/09/22 124/84   11/03/21 120/82          (goal 120/80)  No results found for: BUN  No results found for: CREATININE  No results found for: K

## 2022-06-03 DIAGNOSIS — G47.00 INSOMNIA, UNSPECIFIED TYPE: ICD-10-CM

## 2022-06-03 DIAGNOSIS — I10 ESSENTIAL HYPERTENSION: ICD-10-CM

## 2022-06-03 RX ORDER — AMLODIPINE BESYLATE 5 MG/1
TABLET ORAL
Qty: 90 TABLET | Refills: 0 | Status: SHIPPED | OUTPATIENT
Start: 2022-06-03 | End: 2022-09-08 | Stop reason: SDUPTHER

## 2022-06-03 RX ORDER — ALPRAZOLAM 0.5 MG/1
TABLET ORAL
Qty: 60 TABLET | Refills: 0 | Status: SHIPPED | OUTPATIENT
Start: 2022-06-03 | End: 2022-06-30 | Stop reason: SDUPTHER

## 2022-06-03 NOTE — TELEPHONE ENCOUNTER
Cecillia Duverney APRN-CNP  [facility]  [address] [facility] [address] [address]  [address] [address]  Dept: [phone]  Dept Fax: [phone]    Requested Prescriptions     Signed Prescriptions Disp Refills    ALPRAZolam (XANAX) 0.5 MG tablet 60 tablet 0     Sig: TAKE 1 TO 2 TABLETS AT BEDTIME AS NEEDED FOR SLEEP (MUST MAKE APPT FOR FURTHER REFILLS)     Authorizing Provider: Jae Rodriguez    amLODIPine (NORVASC) 5 MG tablet 90 tablet 0     Sig: TAKE ONE TABLET BY MOUTH ONE TIME DAILY (MUST MAKE APPT FOR FURTHER REFILLS)     Authorizing Provider: Jae Rodriguez       Telephone Refill Encounter    - Covering for Dr. David Uriarte, as she is out of the office.  - Medical history, allergies, and current medication list reviewed. - Patient name and location verified by patient. - Verbal consent obtained to refill medication through a remote evaluation.  - Advised to follow up with PCP as directed. - Chart available to PCP through [employer] for review. Insomnia, unspecified type  - ALPRAZolam (XANAX) 0.5 MG tablet; TAKE 1 TO 2 TABLETS AT BEDTIME AS NEEDED FOR SLEEP (MUST MAKE APPT FOR FURTHER REFILLS)  Dispense: 60 tablet;  Refill: 0      Past Medical History:   Diagnosis Date    Acid reflux     ADHD (attention deficit hyperactivity disorder)     Anxiety     Depression     Insomnia       CURRENT MEDS W/ ASSOC DIAG         Start Date End Date     ALPRAZolam (XANAX) 0.5 MG tablet  22     TAKE 1 TO 2 TABLETS AT BEDTIME AS NEEDED FOR SLEEP (MUST MAKE APPT FOR FURTHER REFILLS)     Associated Diagnoses:  Insomnia, unspecified type     amLODIPine (NORVASC) 5 MG tablet  22  --     TAKE ONE TABLET BY MOUTH ONE TIME DAILY (MUST MAKE APPT FOR FURTHER REFILLS)     Associated Diagnoses:  Essential hypertension     ARIPiprazole (ABILIFY) 15 MG tablet ()  22     Take 1 tablet by mouth daily     Associated Diagnoses:  Schizoaffective disorder, depressive type ([facility] [address]     FLUoxetine (PROZAC) 20 MG capsule  04/25/22 07/24/22     Take 3 capsules by mouth daily Take 3 capsules by mouth daily     Associated Diagnoses:  Schizoaffective disorder, depressive type ([facility] [address]     methylphenidate (CONCERTA) 36 MG extended release tablet  05/31/22 06/30/22     Take 2 tablets by mouth every morning for 30 days.      Associated Diagnoses:  Attention deficit hyperactivity disorder (ADHD), unspecified ADHD type     omeprazole (PRILOSEC) 20 MG delayed release capsule  05/09/22  --     TAKE ONE CAPSULE BY MOUTH TWICE DAILY     Associated Diagnoses:  Gastroesophageal reflux disease without esophagitis     traZODone (DESYREL) 100 MG tablet  04/25/22  --     Take 1 tablet by mouth nightly Take 2 tablets by mouth every night     Associated Diagnoses:  --        DORIS Patel-CNP

## 2022-06-22 RX ORDER — TRAZODONE HYDROCHLORIDE 100 MG/1
200 TABLET ORAL NIGHTLY
Qty: 60 TABLET | Refills: 2 | Status: SHIPPED | OUTPATIENT
Start: 2022-06-22 | End: 2022-09-22 | Stop reason: SDUPTHER

## 2022-06-29 DIAGNOSIS — F90.9 ATTENTION DEFICIT HYPERACTIVITY DISORDER (ADHD), UNSPECIFIED ADHD TYPE: ICD-10-CM

## 2022-06-29 RX ORDER — METHYLPHENIDATE HYDROCHLORIDE 36 MG/1
72 TABLET ORAL EVERY MORNING
Qty: 60 TABLET | Refills: 0 | Status: SHIPPED | OUTPATIENT
Start: 2022-06-29 | End: 2022-07-27 | Stop reason: SDUPTHER

## 2022-06-29 NOTE — TELEPHONE ENCOUNTER
Maxine Beck APRN-[facility] [employer] [facility]  [address] [facility]  [address], [address]  [address] [address]  Dept: [phone]  Dept Fax: [phone]    Requested Prescriptions     Signed Prescriptions Disp Refills    methylphenidate (CONCERTA) 36 MG extended release tablet 60 tablet 0     Sig: Take 2 tablets by mouth every morning for 30 days. Authorizing Provider: Sol Stewart       Telephone Refill Encounter    - Covering for Boom Montelongo, as she is out of the office.  - Medical history, allergies, and current medication list reviewed. - Patient name and location verified by patient. - Verbal consent obtained to refill medication through a remote evaluation.  - Advised to follow up with PCP as directed. - Chart available to PCP through [employer] for review. Attention deficit hyperactivity disorder (ADHD), unspecified ADHD type  - methylphenidate (CONCERTA) 36 MG extended release tablet; Take 2 tablets by mouth every morning for 30 days. Dispense: 60 tablet;  Refill: 0      Past Medical History:   Diagnosis Date    Acid reflux     ADHD (attention deficit hyperactivity disorder)     Anxiety     Depression     Insomnia       CURRENT MEDS W/ ASSOC DIAG         Start Date End Date     ALPRAZolam (XANAX) 0.5 MG tablet  22     TAKE 1 TO 2 TABLETS AT BEDTIME AS NEEDED FOR SLEEP (MUST MAKE APPT FOR FURTHER REFILLS)     Associated Diagnoses:  Insomnia, unspecified type     amLODIPine (NORVASC) 5 MG tablet  22  --     TAKE ONE TABLET BY MOUTH ONE TIME DAILY (MUST MAKE APPT FOR FURTHER REFILLS)     Associated Diagnoses:  Essential hypertension     ARIPiprazole (ABILIFY) 15 MG tablet ()  22     Take 1 tablet by mouth daily     Associated Diagnoses:  Schizoaffective disorder, depressive type (HCC)     FLUoxetine (PROZAC) 20 MG capsule  22     Take 3 capsules by mouth daily Take 3 capsules by mouth daily Associated Diagnoses:  Schizoaffective disorder, depressive type (HCC)     methylphenidate (CONCERTA) 36 MG extended release tablet  06/29/22 07/29/22     Take 2 tablets by mouth every morning for 30 days.      Associated Diagnoses:  Attention deficit hyperactivity disorder (ADHD), unspecified ADHD type     omeprazole (PRILOSEC) 20 MG delayed release capsule  05/09/22  --     TAKE ONE CAPSULE BY MOUTH TWICE DAILY     Associated Diagnoses:  Gastroesophageal reflux disease without esophagitis     traZODone (DESYREL) 100 MG tablet  06/22/22  --     Take 2 tablets by mouth nightly     Associated Diagnoses:  --        Jose Rafael Burt, APRN-CNP

## 2022-06-30 DIAGNOSIS — G47.00 INSOMNIA, UNSPECIFIED TYPE: ICD-10-CM

## 2022-06-30 RX ORDER — ALPRAZOLAM 0.5 MG/1
TABLET ORAL
Qty: 60 TABLET | Refills: 0 | Status: SHIPPED | OUTPATIENT
Start: 2022-07-03 | End: 2022-07-27 | Stop reason: SDUPTHER

## 2022-06-30 NOTE — TELEPHONE ENCOUNTER
Juan Perry APRN-CNP  [facility]  [address], [address]  [facility] [address]  Dept: [phone]  Dept Fax: [phone]    Requested Prescriptions     Signed Prescriptions Disp Refills    ALPRAZolam (XANAX) 0.5 MG tablet 60 tablet 0     Sig: TAKE 1 TO 2 TABLETS AT BEDTIME AS NEEDED FOR SLEEP (MUST MAKE APPT FOR FURTHER REFILLS)     Authorizing Provider: Prerna Pleitez       Telephone Refill Encounter    - Covering for Milton Story, as she is out of the office.  - Medical history, allergies, and current medication list reviewed. - Patient name and location verified by patient. - Verbal consent obtained to refill medication through a remote evaluation.  - Advised to follow up with PCP as directed. - Chart available to PCP through [employer] for review. Insomnia, unspecified type  - ALPRAZolam (XANAX) 0.5 MG tablet; TAKE 1 TO 2 TABLETS AT BEDTIME AS NEEDED FOR SLEEP (MUST MAKE APPT FOR FURTHER REFILLS)  Dispense: 60 tablet;  Refill: 0      Past Medical History:   Diagnosis Date    Acid reflux     ADHD (attention deficit hyperactivity disorder)     Anxiety     Depression     Insomnia       CURRENT MEDS W/ ASSOC DIAG         Start Date End Date     ALPRAZolam (XANAX) 0.5 MG tablet  22     TAKE 1 TO 2 TABLETS AT BEDTIME AS NEEDED FOR SLEEP (MUST MAKE APPT FOR FURTHER REFILLS)     Associated Diagnoses:  Insomnia, unspecified type     amLODIPine (NORVASC) 5 MG tablet  22  --     TAKE ONE TABLET BY MOUTH ONE TIME DAILY (MUST MAKE APPT FOR FURTHER REFILLS)     Associated Diagnoses:  Essential hypertension     ARIPiprazole (ABILIFY) 15 MG tablet ()  22     Take 1 tablet by mouth daily     Associated Diagnoses:  Schizoaffective disorder, depressive type (HCC)     FLUoxetine (PROZAC) 20 MG capsule  22     Take 3 capsules by mouth daily Take 3 capsules by mouth daily     Associated Diagnoses: Schizoaffective disorder, depressive type ([email or web address]     methylphenidate (CONCERTA) 36 MG extended release tablet  06/29/22 07/29/22     Take 2 tablets by mouth every morning for 30 days.      Associated Diagnoses:  Attention deficit hyperactivity disorder (ADHD), unspecified ADHD type     omeprazole (PRILOSEC) 20 MG delayed release capsule  05/09/22  --     TAKE ONE CAPSULE BY MOUTH TWICE DAILY     Associated Diagnoses:  Gastroesophageal reflux disease without esophagitis     traZODone (DESYREL) 100 MG tablet  06/22/22  --     Take 2 tablets by mouth nightly     Associated Diagnoses:  --        Joi Connolly, APRN-CNP

## 2022-07-25 DIAGNOSIS — F25.1 SCHIZOAFFECTIVE DISORDER, DEPRESSIVE TYPE (HCC): ICD-10-CM

## 2022-07-25 RX ORDER — ARIPIPRAZOLE 15 MG/1
15 TABLET ORAL DAILY
Qty: 90 TABLET | Refills: 1 | Status: SHIPPED | OUTPATIENT
Start: 2022-07-25 | End: 2022-09-30 | Stop reason: SDUPTHER

## 2022-07-25 NOTE — TELEPHONE ENCOUNTER
Last Visit Date: 5/9/2022   Next Visit Date: 8/9/2022       OP pharmacy called to ask for a refill of abilify for the patient.

## 2022-07-26 NOTE — PROGRESS NOTES
[facility] opened schedule for Ludivina Vargas to be added 
Writer called to inform pt of internet outage and potential need to reschedule appointment. Writer indicated that she would call back with updates if network resumed functioning. Pt indicated they understood and elected to cancel and keep next appointment. Pt declined to resume session when network returned.
normal...

## 2022-07-27 DIAGNOSIS — G47.00 INSOMNIA, UNSPECIFIED TYPE: ICD-10-CM

## 2022-07-27 DIAGNOSIS — F90.9 ATTENTION DEFICIT HYPERACTIVITY DISORDER (ADHD), UNSPECIFIED ADHD TYPE: ICD-10-CM

## 2022-07-27 RX ORDER — ALPRAZOLAM 0.5 MG/1
TABLET ORAL
Qty: 60 TABLET | Refills: 0 | Status: SHIPPED | OUTPATIENT
Start: 2022-07-27 | End: 2022-08-29 | Stop reason: SDUPTHER

## 2022-07-27 RX ORDER — METHYLPHENIDATE HYDROCHLORIDE 36 MG/1
72 TABLET ORAL EVERY MORNING
Qty: 60 TABLET | Refills: 0 | Status: SHIPPED | OUTPATIENT
Start: 2022-07-27 | End: 2022-08-29 | Stop reason: SDUPTHER

## 2022-08-02 DIAGNOSIS — G47.00 INSOMNIA, UNSPECIFIED TYPE: ICD-10-CM

## 2022-08-02 RX ORDER — ALPRAZOLAM 0.5 MG/1
TABLET ORAL
Qty: 60 TABLET | Refills: 0 | OUTPATIENT
Start: 2022-08-02 | End: 2022-09-01

## 2022-08-10 ENCOUNTER — TELEMEDICINE (OUTPATIENT)
Dept: PRIMARY CARE CLINIC | Age: 47
End: 2022-08-10
Payer: MEDICAID

## 2022-08-10 DIAGNOSIS — E53.8 VITAMIN B 12 DEFICIENCY: ICD-10-CM

## 2022-08-10 DIAGNOSIS — Z13.1 SCREENING FOR DIABETES MELLITUS: ICD-10-CM

## 2022-08-10 DIAGNOSIS — Z13.29 SCREENING FOR THYROID DISORDER: ICD-10-CM

## 2022-08-10 DIAGNOSIS — I10 ESSENTIAL HYPERTENSION: ICD-10-CM

## 2022-08-10 DIAGNOSIS — Z13.0 SCREENING FOR DEFICIENCY ANEMIA: ICD-10-CM

## 2022-08-10 DIAGNOSIS — F90.9 ATTENTION DEFICIT HYPERACTIVITY DISORDER (ADHD), UNSPECIFIED ADHD TYPE: ICD-10-CM

## 2022-08-10 DIAGNOSIS — Z13.220 ENCOUNTER FOR LIPID SCREENING FOR CARDIOVASCULAR DISEASE: ICD-10-CM

## 2022-08-10 DIAGNOSIS — Z76.89 ENCOUNTER TO ESTABLISH CARE: Primary | ICD-10-CM

## 2022-08-10 DIAGNOSIS — G47.00 INSOMNIA, UNSPECIFIED TYPE: ICD-10-CM

## 2022-08-10 DIAGNOSIS — Z13.6 ENCOUNTER FOR LIPID SCREENING FOR CARDIOVASCULAR DISEASE: ICD-10-CM

## 2022-08-10 DIAGNOSIS — K21.9 GASTROESOPHAGEAL REFLUX DISEASE WITHOUT ESOPHAGITIS: ICD-10-CM

## 2022-08-10 DIAGNOSIS — F25.1 SCHIZOAFFECTIVE DISORDER, DEPRESSIVE TYPE (HCC): ICD-10-CM

## 2022-08-10 DIAGNOSIS — E55.9 VITAMIN D DEFICIENCY: ICD-10-CM

## 2022-08-10 PROCEDURE — G8427 DOCREV CUR MEDS BY ELIG CLIN: HCPCS | Performed by: NURSE PRACTITIONER

## 2022-08-10 PROCEDURE — 99214 OFFICE O/P EST MOD 30 MIN: CPT | Performed by: NURSE PRACTITIONER

## 2022-08-10 ASSESSMENT — ENCOUNTER SYMPTOMS
VOMITING: 0
ABDOMINAL PAIN: 0
EYE ITCHING: 0
SINUS PRESSURE: 0
COUGH: 0
WHEEZING: 0
TROUBLE SWALLOWING: 0
SINUS PAIN: 0
SHORTNESS OF BREATH: 0
EYE DISCHARGE: 0
SORE THROAT: 0
CHEST TIGHTNESS: 0
EYE REDNESS: 0
DIARRHEA: 0
NAUSEA: 0

## 2022-08-10 NOTE — PROGRESS NOTES
tablet Take 2 tablets by mouth nightly  DORIS Joaquin CNP   amLODIPine (NORVASC) 5 MG tablet TAKE ONE TABLET BY MOUTH ONE TIME DAILY (MUST MAKE APPT FOR FURTHER REFILLS)  DORIS Joaquin CNP   omeprazole (PRILOSEC) 20 MG delayed release capsule TAKE ONE CAPSULE BY MOUTH TWICE DAILY  DORIS Dow NP   FLUoxetine (PROZAC) 20 MG capsule Take 3 capsules by mouth daily Take 3 capsules by mouth daily  DORIS Dow NP       Social History     Tobacco Use    Smoking status: Never    Smokeless tobacco: Never   Vaping Use    Vaping Use: Never used   Substance Use Topics    Alcohol use: Not Currently    Drug use: Not Currently        No Known Allergies,   Past Medical History:   Diagnosis Date    Acid reflux     ADHD (attention deficit hyperactivity disorder)     Anxiety     Depression     Insomnia    ,   Past Surgical History:   Procedure Laterality Date    APPENDECTOMY      CHOLECYSTECTOMY     ,   Social History     Tobacco Use    Smoking status: Never    Smokeless tobacco: Never   Vaping Use    Vaping Use: Never used   Substance Use Topics    Alcohol use: Not Currently    Drug use: Not Currently   ,   Family History   Problem Relation Age of Onset    Breast Cancer Mother     Cancer Father     Heart Disease Father    ,   Immunization History   Administered Date(s) Administered    Influenza, MDCK Quadv, IM, PF (Flucelvax 2 yrs and older) 12/02/2021   ,   Health Maintenance   Topic Date Due    Lipids  11/03/2022 (Originally 11/18/2017)    DTaP/Tdap/Td vaccine (1 - Tdap) 11/03/2022 (Originally 11/18/1996)    COVID-19 Vaccine (1) 11/03/2022 (Originally 5/18/1978)    Diabetes screen  11/03/2022 (Originally 11/18/2012)    Hepatitis C screen  11/03/2022 (Originally 11/18/1995)    HIV screen  11/03/2022 (Originally 11/18/1992)    Flu vaccine (1) 09/01/2022    Depression Monitoring  01/20/2023    Hepatitis A vaccine  Aged Out    Hepatitis B vaccine  Aged Out    Hib vaccine  Aged Out Meningococcal (ACWY) vaccine  Aged Out    Pneumococcal 0-64 years Vaccine  Aged Out       PHYSICAL EXAMINATION:  [ INSTRUCTIONS:  \"[x]\" Indicates a positive item  \"[]\" Indicates a negative item  -- DELETE ALL ITEMS NOT EXAMINED]  Vital Signs: (As obtained by patient/caregiver or practitioner observation)    Constitutional: [x] Appears well-developed and well-nourished [x] No apparent distress      [] Abnormal-   Mental status  [x] Alert and awake  [x] Oriented to person/place/time [x]Able to follow commands      Eyes:  EOM    [x]  Normal  [] Abnormal-  Sclera  []  Normal  [] Abnormal -         Discharge []  None visible  [] Abnormal -    HENT:   [x] Normocephalic, atraumatic. [] Abnormal   [x] Mouth/Throat: Mucous membranes are moist.     External Ears [x] Normal  [] Abnormal-     Neck: [x] No visualized mass     Pulmonary/Chest: [x] Respiratory effort normal.  [x] No visualized signs of difficulty breathing or respiratory distress        [] Abnormal-      Musculoskeletal:   [] Normal gait with no signs of ataxia         [x] Normal range of motion of neck        [] Abnormal-       Neurological:        [x] No Facial Asymmetry (Cranial nerve 7 motor function) (limited exam to video visit)          [] No gaze palsy        [] Abnormal-         Skin:        [x] No significant exanthematous lesions or discoloration noted on facial skin         [] Abnormal-            Psychiatric:       [x] Normal Affect [] No Hallucinations        [] Abnormal-    ASSESSMENT/PLAN:  1. Encounter to establish care  -Reviewed previous encounters and lab work. Patient is willing to complete labs  2. Vitamin B 12 deficiency  - Vitamin B12 & Folate; Future  3. Vitamin D deficiency  - Vitamin D 25 Hydroxy; Future  4. Screening for thyroid disorder  - TSH with Reflex; Future  5. Encounter for lipid screening for cardiovascular disease  - Lipid Panel; Future  - Comprehensive Metabolic Panel; Future  6.  Screening for deficiency anemia  - CBC; Future  7. Screening for diabetes mellitus  - Hemoglobin A1C; Future  8. Essential hypertension  Bp stable. C/w norvasc 5 mg daiy   9. Schizoaffective disorder, depressive type ([address]  Continue with fluoxetine 60 mg daily, Abilify 15 mg daily  Continue with Xanax 0.5 mg 1-2 tabs at bedtime  10. Attention deficit hyperactivity disorder (ADHD), unspecified ADHD type  C/w concerta 76 mg daily     11. Insomnia, unspecified type  C/w trazadone 200 mg nightly   12. Gastroesophageal reflux disease without esophagitis  C/w omeprazole 20 mg daily    Return in about 3 months (around 11/10/2022) for adhd follow up. [place], was evaluated through a synchronous (real-time) audio-video encounter. The patient (or guardian if applicable) is aware that this is a billable service, which includes applicable co-pays. This Virtual Visit was conducted with patient's (and/or legal guardian's) consent. The visit was conducted pursuant to the emergency declaration under the [address] [place] [address], [address] [place] [address] authority and the [employer] and [employer] Act. Patient identification was verified, and a caregiver was present when appropriate. The patient was located at Home: [address] [place] [address]. Provider was [facility] at [facility] [address] [place] [address],[address]  Anat Long[place],  [facility] [address] Total time spent on this encounter: Not billed by time    --DORIS Flores CNP on 8/10/2022 at 8:54 AM    An electronic signature was used to authenticate this note.

## 2022-08-29 ENCOUNTER — PATIENT MESSAGE (OUTPATIENT)
Dept: PRIMARY CARE CLINIC | Age: 47
End: 2022-08-29

## 2022-08-29 DIAGNOSIS — F90.9 ATTENTION DEFICIT HYPERACTIVITY DISORDER (ADHD), UNSPECIFIED ADHD TYPE: ICD-10-CM

## 2022-08-29 DIAGNOSIS — G47.00 INSOMNIA, UNSPECIFIED TYPE: ICD-10-CM

## 2022-08-29 RX ORDER — ALPRAZOLAM 0.5 MG/1
TABLET ORAL
Qty: 60 TABLET | Refills: 0 | Status: SHIPPED | OUTPATIENT
Start: 2022-08-29 | End: 2022-09-30 | Stop reason: SDUPTHER

## 2022-08-29 RX ORDER — METHYLPHENIDATE HYDROCHLORIDE 36 MG/1
72 TABLET ORAL EVERY MORNING
Qty: 60 TABLET | Refills: 0 | Status: SHIPPED | OUTPATIENT
Start: 2022-08-29 | End: 2022-09-30 | Stop reason: SDUPTHER

## 2022-08-29 NOTE — TELEPHONE ENCOUNTER
From: Araceli Huang  To: Subhash Salazar  Sent: 8/29/2022 8:59 AM EDT  Subject: Concerta/Xanax Prescription    Hello,    I am out of Concerta (36 mg twice in the morning) and almost out of Xanax (.5 mg twice at night). [facility], but I have been taking it for many years. Could you please send both prescriptions to the [facility] [address] in [place] [address] [place].      Thank you    Tawny Blount

## 2022-09-02 ENCOUNTER — TELEPHONE (OUTPATIENT)
Dept: PRIMARY CARE CLINIC | Age: 47
End: 2022-09-02

## 2022-09-02 ENCOUNTER — E-VISIT (OUTPATIENT)
Dept: PRIMARY CARE CLINIC | Age: 47
End: 2022-09-02
Payer: MEDICAID

## 2022-09-02 DIAGNOSIS — J06.9 VIRAL UPPER RESPIRATORY TRACT INFECTION: Primary | ICD-10-CM

## 2022-09-02 DIAGNOSIS — Z20.822 EXPOSURE TO COVID-19 VIRUS: ICD-10-CM

## 2022-09-02 PROCEDURE — 99423 OL DIG E/M SVC 21+ MIN: CPT | Performed by: NURSE PRACTITIONER

## 2022-09-02 ASSESSMENT — LIFESTYLE VARIABLES: SMOKING_STATUS: NO, I HAVE NEVER SMOKED

## 2022-09-02 NOTE — TELEPHONE ENCOUNTER
Patient called stating that last weekend he was exposed to Lee. 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 Patient complains of congestion, and body aches currently. He states that he has tested himself but his test this morning came back negative. Patient is asking what he should do.

## 2022-09-02 NOTE — PROGRESS NOTES
Reviewed questionnaire  Reviewed previous encounters, medications, allergies and history     Dx viral URI  Exposure to covid     Plan    pt does not qualify for paxlovid d/t current medications and their interactions. Recommend supportive care measures    1. Water: Drink 1/2 of body weight (lb) in ounces,  Up to 90 Ounces of water per day. This will loosen mucus in the head and chest & improve the weak feeling of dehydration, allow the body to get germ fighting resources to the infection. Half can be juice or sugar free powerade or garorate. Don't count drinks with caffeine or carbonation. Infants can have Pedialyte liquid or freezer pops. Avoid salt if you have high Blood Pressure, swelling in the feet or ankles or have heart problems. 2. Humidity: Humidify the air to 35-50% ( or until the windows fog over slightly). Summer use of an air conditioner turned down too far and can result in dry air. Can use a humidifier, vaporizer, boil water on the stove or put a coffee can full of water on the heater vents. This will loosen mucus from infections and allergies. 3. Sleep: Get 8-10 hours a night and rest during the evening after work or school. If you have trouble sleeping, adults can take Melatonin 5mg up to 2 tabs at bedtime ( not for children or pregnant women). If Mono is suspected then sleep during 9PM to 9AM time span (if possible.)   4. Cough: Take cough medicines with Guaifenesin ( to loosen chest or head congestion) and Dextromethorphan ( to decrease excess cough). Robitussin D.M. Syrup every 4-6 hrs or Mucinex D. M. pills twice a day. Use the pediatric formulations for children over 6 months making sure they are alcohol & sugar free for children, pregnant women, and diabetics. 5. Pain And Fevers: Take Acetaminophen ( Tylenol) for fevers, aches, and headaches. 2-500 mg every 8 hours for adults. Appropriate doses at bedtime for children may help them sleep better.  Ibuprofen may be used if not pregnant, but

## 2022-09-06 DIAGNOSIS — I10 ESSENTIAL HYPERTENSION: ICD-10-CM

## 2022-09-06 RX ORDER — AMLODIPINE BESYLATE 5 MG/1
5 TABLET ORAL DAILY
Qty: 90 TABLET | Refills: 1 | OUTPATIENT
Start: 2022-09-06

## 2022-09-07 ENCOUNTER — PATIENT MESSAGE (OUTPATIENT)
Dept: PRIMARY CARE CLINIC | Age: 47
End: 2022-09-07

## 2022-09-07 DIAGNOSIS — I10 ESSENTIAL HYPERTENSION: ICD-10-CM

## 2022-09-08 RX ORDER — AMLODIPINE BESYLATE 5 MG/1
TABLET ORAL
Qty: 90 TABLET | Refills: 0 | Status: SHIPPED | OUTPATIENT
Start: 2022-09-08

## 2022-09-22 ENCOUNTER — PATIENT MESSAGE (OUTPATIENT)
Dept: PRIMARY CARE CLINIC | Age: 47
End: 2022-09-22

## 2022-09-22 RX ORDER — TRAZODONE HYDROCHLORIDE 100 MG/1
200 TABLET ORAL NIGHTLY
Qty: 60 TABLET | Refills: 2 | Status: SHIPPED | OUTPATIENT
Start: 2022-09-22

## 2022-09-22 NOTE — TELEPHONE ENCOUNTER
From: Triston Brown  To: Aminah Holloway  Sent: 9/22/2022 6:26 AM EDT  Subject: Refill For Trazedone    Hello:    I take Trazedone 100mg 2x / at night. I need a refill for it.  Please send it to [facility].     Thank Cynthia Gore

## 2022-09-26 ENCOUNTER — PATIENT MESSAGE (OUTPATIENT)
Dept: PRIMARY CARE CLINIC | Age: 47
End: 2022-09-26

## 2022-09-26 DIAGNOSIS — E34.9 TESTOSTERONE DEFICIENCY: Primary | ICD-10-CM

## 2022-09-26 NOTE — TELEPHONE ENCOUNTER
From: Tommy Prescott  To: Barbara Braswell  Sent: 9/26/2022 12:13 PM EDT  Subject: Testosterone    Serafin Carvalho Cure,    I have had debilitating major depression going on 24 years (I havent been able to work during any of that time: Im on SSI disability). I have tried a wide variety of treatments. Almost every conceivable combination of anti-depressants along with myriad other psychotropic drugs. Under a doctors supervision, I have had ketamine treatments. I underwent ECT. I have been in talk therapy for many many years. I am currently on a lot of meds as you know. But, nothing has helped. The symptoms remain. I recently learned that testosterone might help with depression. I read a meta-analysis in [address] [place] [address] Psychiatry form 2019 to this effect. My symptoms are dominated by fatigue and apathy/lack of motivation. I also struggle with sexual dysfunction. I have never taken testosterone, however in 2017 I was tested for it and though my result was 350 ng/dL and so within the supposedly normal range, it was clearly on the low end. I am hoping to give it a try to see if it could help. I was wondering if you   would be willing to prescribe it. If not, could you refer me to an endocrinologist for this?      Thank you,    Tommy Prescott

## 2022-09-30 ENCOUNTER — PATIENT MESSAGE (OUTPATIENT)
Dept: PRIMARY CARE CLINIC | Age: 47
End: 2022-09-30

## 2022-09-30 DIAGNOSIS — F25.1 SCHIZOAFFECTIVE DISORDER, DEPRESSIVE TYPE (HCC): ICD-10-CM

## 2022-09-30 DIAGNOSIS — G47.00 INSOMNIA, UNSPECIFIED TYPE: ICD-10-CM

## 2022-09-30 DIAGNOSIS — F90.9 ATTENTION DEFICIT HYPERACTIVITY DISORDER (ADHD), UNSPECIFIED ADHD TYPE: ICD-10-CM

## 2022-09-30 RX ORDER — ALPRAZOLAM 0.5 MG/1
TABLET ORAL
Qty: 60 TABLET | Refills: 0 | Status: SHIPPED | OUTPATIENT
Start: 2022-09-30 | End: 2022-10-31 | Stop reason: SDUPTHER

## 2022-09-30 RX ORDER — ARIPIPRAZOLE 15 MG/1
15 TABLET ORAL DAILY
Qty: 90 TABLET | Refills: 1 | Status: SHIPPED | OUTPATIENT
Start: 2022-09-30 | End: 2022-12-29

## 2022-09-30 RX ORDER — METHYLPHENIDATE HYDROCHLORIDE 36 MG/1
72 TABLET ORAL EVERY MORNING
Qty: 60 TABLET | Refills: 0 | Status: SHIPPED | OUTPATIENT
Start: 2022-09-30 | End: 2022-10-31 | Stop reason: SDUPTHER

## 2022-09-30 NOTE — TELEPHONE ENCOUNTER
From: Vilma   To: Sia Amezcua  Sent: 9/30/2022 6:48 AM EDT  Subject: Need Med    Hello:    I need an Abilify refill. I take 15mg 1/day. Please send to [facility]. Thank you.     [place]

## 2022-09-30 NOTE — TELEPHONE ENCOUNTER
From: Andressa Mauricio  To: Adonis Infante  Sent: 9/30/2022 9:14 AM EDT  Subject: Alprazolam    Hello:    I need a refill for Alprazolam 0.5mg 2/day at [facility]. Thank you.      Janet Thurman

## 2022-10-13 ENCOUNTER — TELEPHONE (OUTPATIENT)
Dept: UROLOGY | Age: 47
End: 2022-10-13

## 2022-10-13 NOTE — TELEPHONE ENCOUNTER
Patient called in and stated \"I am having covid like symptoms. I was wondering if todays visit can be discussed over the phone. \" Patient is scheduled @ 9:40A    Writer attempted to get patient rescheduled. Patient declined.  MA will speak with

## 2022-10-31 ENCOUNTER — PATIENT MESSAGE (OUTPATIENT)
Dept: PRIMARY CARE CLINIC | Age: 47
End: 2022-10-31

## 2022-10-31 DIAGNOSIS — G47.00 INSOMNIA, UNSPECIFIED TYPE: ICD-10-CM

## 2022-10-31 DIAGNOSIS — F90.9 ATTENTION DEFICIT HYPERACTIVITY DISORDER (ADHD), UNSPECIFIED ADHD TYPE: ICD-10-CM

## 2022-10-31 RX ORDER — METHYLPHENIDATE HYDROCHLORIDE 36 MG/1
72 TABLET ORAL EVERY MORNING
Qty: 60 TABLET | Refills: 0 | Status: SHIPPED | OUTPATIENT
Start: 2022-10-31 | End: 2022-10-31 | Stop reason: SDUPTHER

## 2022-10-31 RX ORDER — ALPRAZOLAM 0.5 MG/1
TABLET ORAL
Qty: 60 TABLET | Refills: 0 | Status: SHIPPED | OUTPATIENT
Start: 2022-10-31 | End: 2022-11-30

## 2022-10-31 RX ORDER — METHYLPHENIDATE HYDROCHLORIDE 36 MG/1
72 TABLET ORAL EVERY MORNING
Qty: 60 TABLET | Refills: 0 | Status: SHIPPED | OUTPATIENT
Start: 2022-10-31 | End: 2022-11-28 | Stop reason: SDUPTHER

## 2022-10-31 NOTE — TELEPHONE ENCOUNTER
Antonio advised Pt the concerta is on backorder for them so he asked if this could be sent to the [address] [place] [address].  Please advice      Last Visit Date: 9/2/2022   Next Visit Date: Visit date not found

## 2022-10-31 NOTE — TELEPHONE ENCOUNTER
From: Fady Hernandez  To: Fady Reyes  Sent: 10/31/2022 6:59 AM EDT  Subject: Need Meds    Hello. I need Concerta and Xanax. Im completely out of Concerta and almost out of Xanax. Please send them to [facility]. By the way, I am going to set up an appointment today to be seen by you, and I will get bloodwork done this week. Also, could I get my testosterone checked with my bloodwork? I am pretty sure its low but would like to make sure. Thank you.     Fady Hernandez

## 2022-11-08 ENCOUNTER — PATIENT MESSAGE (OUTPATIENT)
Dept: PRIMARY CARE CLINIC | Age: 47
End: 2022-11-08

## 2022-11-08 DIAGNOSIS — F25.1 SCHIZOAFFECTIVE DISORDER, DEPRESSIVE TYPE (HCC): ICD-10-CM

## 2022-11-08 RX ORDER — FLUOXETINE HYDROCHLORIDE 20 MG/1
60 CAPSULE ORAL DAILY
Qty: 270 CAPSULE | Refills: 1 | Status: SHIPPED | OUTPATIENT
Start: 2022-11-08 | End: 2023-02-06

## 2022-11-08 NOTE — TELEPHONE ENCOUNTER
From: Radha Duarte  To: Carnell Duane  Sent: 11/8/2022 6:09 AM EST  Subject: Need Med    Hello:    I need a refill for my Prozac prescription.  Please send it to [facility]

## 2022-11-28 ENCOUNTER — PATIENT MESSAGE (OUTPATIENT)
Dept: PRIMARY CARE CLINIC | Age: 47
End: 2022-11-28

## 2022-11-28 DIAGNOSIS — F90.9 ATTENTION DEFICIT HYPERACTIVITY DISORDER (ADHD), UNSPECIFIED ADHD TYPE: ICD-10-CM

## 2022-11-28 RX ORDER — METHYLPHENIDATE HYDROCHLORIDE 36 MG/1
72 TABLET ORAL EVERY MORNING
Qty: 60 TABLET | Refills: 0 | Status: SHIPPED | OUTPATIENT
Start: 2022-11-28 | End: 2022-11-28 | Stop reason: SDUPTHER

## 2022-11-28 RX ORDER — METHYLPHENIDATE HYDROCHLORIDE 36 MG/1
72 TABLET ORAL EVERY MORNING
Qty: 60 TABLET | Refills: 0 | Status: SHIPPED | OUTPATIENT
Start: 2022-11-28 | End: 2022-12-28

## 2022-11-28 NOTE — TELEPHONE ENCOUNTER
From: Armida Ackerman  To: Austin Rojo  Sent: 11/28/2022 9:07 AM EST  Subject: Concerta    Hello:    I have two days left of Concerta. Please send a refill to [facility] [place]. Thank you very much.      Andrew Khan

## 2022-11-28 NOTE — TELEPHONE ENCOUNTER
From: Roddy Martin  To: Lila Chowdary  Sent: 11/28/2022 11:16 AM EST  Subject: Concerta    Hi,    Today I sent you a message regarding a Concerta refill. I said that I was down to two days left of the medication. I told you to send the prescription to [facility], which you did. Unfortunately [facility] is out of Concerta. Can you send the prescription to [facility] at the [facility], instead?      Thanks    Joann Rahman

## 2022-12-02 ENCOUNTER — PATIENT MESSAGE (OUTPATIENT)
Dept: PRIMARY CARE CLINIC | Age: 47
End: 2022-12-02

## 2022-12-02 DIAGNOSIS — G47.00 INSOMNIA, UNSPECIFIED TYPE: ICD-10-CM

## 2022-12-02 DIAGNOSIS — K21.9 GASTROESOPHAGEAL REFLUX DISEASE WITHOUT ESOPHAGITIS: ICD-10-CM

## 2022-12-02 DIAGNOSIS — I10 ESSENTIAL HYPERTENSION: ICD-10-CM

## 2022-12-02 RX ORDER — OMEPRAZOLE 20 MG/1
CAPSULE, DELAYED RELEASE ORAL
Qty: 180 CAPSULE | Refills: 1 | Status: SHIPPED | OUTPATIENT
Start: 2022-12-02

## 2022-12-02 RX ORDER — ALPRAZOLAM 0.5 MG/1
TABLET ORAL
Qty: 60 TABLET | Refills: 0 | Status: SHIPPED | OUTPATIENT
Start: 2022-12-02 | End: 2023-01-01

## 2022-12-02 RX ORDER — AMLODIPINE BESYLATE 5 MG/1
TABLET ORAL
Qty: 90 TABLET | Refills: 0 | Status: SHIPPED | OUTPATIENT
Start: 2022-12-02

## 2022-12-02 NOTE — TELEPHONE ENCOUNTER
Called patient and let him know that his refills were sent in and that he needed to set up another appointment before you refill again. We set up an appointment for 12/5/22.

## 2022-12-02 NOTE — TELEPHONE ENCOUNTER
From: Giovanna Cisneros  To: Elena Jernigan  Sent: 12/2/2022 6:55 AM EST  Subject: Need Med Refills    Hello:    I need to get refills on Xanax, Amlodipine, and Omeprazole. I am completely out of the Xanax, so that's the most important one. I'm worried that if I don't get that one today that I'll get sick. Please send them to [facility].     I also need you to be aware that I was only able to get a half-month's supply of the Concerta for the last refill you sent in. In around 10 days I will need a new prescription. I tried to get it at [facility] and they didn't have it in stock. So, I went through [facility], but they could only get me 15 days' worth. They told me I would need a new prescription to get it again. If you call them they will verify this.      Thank Julienne Salazar

## 2022-12-05 ENCOUNTER — OFFICE VISIT (OUTPATIENT)
Dept: PRIMARY CARE CLINIC | Age: 47
End: 2022-12-05
Payer: MEDICAID

## 2022-12-05 VITALS
OXYGEN SATURATION: 96 % | SYSTOLIC BLOOD PRESSURE: 116 MMHG | HEART RATE: 105 BPM | HEIGHT: 75 IN | RESPIRATION RATE: 16 BRPM | DIASTOLIC BLOOD PRESSURE: 80 MMHG | WEIGHT: 280.4 LBS | BODY MASS INDEX: 34.86 KG/M2

## 2022-12-05 DIAGNOSIS — F33.9 RECURRENT MAJOR DEPRESSIVE DISORDER, REMISSION STATUS UNSPECIFIED (HCC): ICD-10-CM

## 2022-12-05 DIAGNOSIS — Z23 NEED FOR INFLUENZA VACCINATION: ICD-10-CM

## 2022-12-05 DIAGNOSIS — Z13.220 ENCOUNTER FOR LIPID SCREENING FOR CARDIOVASCULAR DISEASE: ICD-10-CM

## 2022-12-05 DIAGNOSIS — F90.9 ATTENTION DEFICIT HYPERACTIVITY DISORDER (ADHD), UNSPECIFIED ADHD TYPE: Primary | ICD-10-CM

## 2022-12-05 DIAGNOSIS — I10 ESSENTIAL HYPERTENSION: ICD-10-CM

## 2022-12-05 DIAGNOSIS — Z13.6 ENCOUNTER FOR LIPID SCREENING FOR CARDIOVASCULAR DISEASE: ICD-10-CM

## 2022-12-05 PROCEDURE — G8427 DOCREV CUR MEDS BY ELIG CLIN: HCPCS | Performed by: NURSE PRACTITIONER

## 2022-12-05 PROCEDURE — 3074F SYST BP LT 130 MM HG: CPT | Performed by: NURSE PRACTITIONER

## 2022-12-05 PROCEDURE — 3079F DIAST BP 80-89 MM HG: CPT | Performed by: NURSE PRACTITIONER

## 2022-12-05 PROCEDURE — 1036F TOBACCO NON-USER: CPT | Performed by: NURSE PRACTITIONER

## 2022-12-05 PROCEDURE — 90674 CCIIV4 VAC NO PRSV 0.5 ML IM: CPT | Performed by: NURSE PRACTITIONER

## 2022-12-05 PROCEDURE — G8482 FLU IMMUNIZE ORDER/ADMIN: HCPCS | Performed by: NURSE PRACTITIONER

## 2022-12-05 PROCEDURE — 99214 OFFICE O/P EST MOD 30 MIN: CPT | Performed by: NURSE PRACTITIONER

## 2022-12-05 PROCEDURE — G8417 CALC BMI ABV UP PARAM F/U: HCPCS | Performed by: NURSE PRACTITIONER

## 2022-12-05 PROCEDURE — 90471 IMMUNIZATION ADMIN: CPT | Performed by: NURSE PRACTITIONER

## 2022-12-05 SDOH — ECONOMIC STABILITY: FOOD INSECURITY: WITHIN THE PAST 12 MONTHS, YOU WORRIED THAT YOUR FOOD WOULD RUN OUT BEFORE YOU GOT MONEY TO BUY MORE.: NEVER TRUE

## 2022-12-05 SDOH — ECONOMIC STABILITY: TRANSPORTATION INSECURITY
IN THE PAST 12 MONTHS, HAS THE LACK OF TRANSPORTATION KEPT YOU FROM MEDICAL APPOINTMENTS OR FROM GETTING MEDICATIONS?: NO

## 2022-12-05 SDOH — ECONOMIC STABILITY: FOOD INSECURITY: WITHIN THE PAST 12 MONTHS, THE FOOD YOU BOUGHT JUST DIDN'T LAST AND YOU DIDN'T HAVE MONEY TO GET MORE.: NEVER TRUE

## 2022-12-05 SDOH — ECONOMIC STABILITY: TRANSPORTATION INSECURITY
IN THE PAST 12 MONTHS, HAS LACK OF TRANSPORTATION KEPT YOU FROM MEETINGS, WORK, OR FROM GETTING THINGS NEEDED FOR DAILY LIVING?: NO

## 2022-12-05 ASSESSMENT — ENCOUNTER SYMPTOMS
SINUS PRESSURE: 0
EYE REDNESS: 0
WHEEZING: 0
VOMITING: 0
TROUBLE SWALLOWING: 0
CHEST TIGHTNESS: 0
ABDOMINAL PAIN: 0
SORE THROAT: 0
NAUSEA: 0
DIARRHEA: 0
SINUS PAIN: 0
COUGH: 0
SHORTNESS OF BREATH: 0
EYE ITCHING: 0
EYE DISCHARGE: 0

## 2022-12-05 ASSESSMENT — PATIENT HEALTH QUESTIONNAIRE - PHQ9
SUM OF ALL RESPONSES TO PHQ QUESTIONS 1-9: 13
4. FEELING TIRED OR HAVING LITTLE ENERGY: 3
5. POOR APPETITE OR OVEREATING: 0
6. FEELING BAD ABOUT YOURSELF - OR THAT YOU ARE A FAILURE OR HAVE LET YOURSELF OR YOUR FAMILY DOWN: 1
SUM OF ALL RESPONSES TO PHQ QUESTIONS 1-9: 13
10. IF YOU CHECKED OFF ANY PROBLEMS, HOW DIFFICULT HAVE THESE PROBLEMS MADE IT FOR YOU TO DO YOUR WORK, TAKE CARE OF THINGS AT HOME, OR GET ALONG WITH OTHER PEOPLE: 1
2. FEELING DOWN, DEPRESSED OR HOPELESS: 3
7. TROUBLE CONCENTRATING ON THINGS, SUCH AS READING THE NEWSPAPER OR WATCHING TELEVISION: 0
SUM OF ALL RESPONSES TO PHQ QUESTIONS 1-9: 13
SUM OF ALL RESPONSES TO PHQ QUESTIONS 1-9: 13
3. TROUBLE FALLING OR STAYING ASLEEP: 3
8. MOVING OR SPEAKING SO SLOWLY THAT OTHER PEOPLE COULD HAVE NOTICED. OR THE OPPOSITE, BEING SO FIGETY OR RESTLESS THAT YOU HAVE BEEN MOVING AROUND A LOT MORE THAN USUAL: 0
9. THOUGHTS THAT YOU WOULD BE BETTER OFF DEAD, OR OF HURTING YOURSELF: 0
SUM OF ALL RESPONSES TO PHQ9 QUESTIONS 1 & 2: 6
1. LITTLE INTEREST OR PLEASURE IN DOING THINGS: 3

## 2022-12-05 ASSESSMENT — SOCIAL DETERMINANTS OF HEALTH (SDOH): HOW HARD IS IT FOR YOU TO PAY FOR THE VERY BASICS LIKE FOOD, HOUSING, MEDICAL CARE, AND HEATING?: NOT VERY HARD

## 2022-12-05 NOTE — PROGRESS NOTES
[facility] PRIMARY CARE  [facility] [address] [facility] [address]  [address] [address]  Dept: [phone]  Dept Fax: [phone]    Manjeet Barnes is a 41 y.o. male who presents today for his medical conditions/complaintsas noted below. Manjeet Barnes is c/o of ADHD (Medication check, needs refill for 30 day supply was only able to get half of the prescription fill at [facility])        HPI:     Patient presents for 3-month follow-up  Blood pressure stable  Weight is stable    Patient presents for 3-month follow-up for ADHD. He was only able to perceive half of his last prescription as there is a backorder on his Concerta. He does plan on calling around to see who may have it in stock. He does follow with a therapist.  He is looking for a new psychiatrist.  His last official diagnosis was major depression. He always has depression. No thoughts of harming himself or others. Otherwise things are going well.        Past Medical History:   Diagnosis Date    Acid reflux     ADHD (attention deficit hyperactivity disorder)     Anxiety     Depression     Insomnia       Past Surgical History:   Procedure Laterality Date    APPENDECTOMY      CHOLECYSTECTOMY         Family History   Problem Relation Age of Onset    Breast Cancer Mother     Cancer Father     Heart Disease Father        Social History     Tobacco Use    Smoking status: Never    Smokeless tobacco: Never   Substance Use Topics    Alcohol use: Not Currently      Current Outpatient Medications   Medication Sig Dispense Refill    ALPRAZolam (XANAX) 0.5 MG tablet TAKE 1 TO 2 TABLETS AT BEDTIME AS NEEDED FOR SLEEP 60 tablet 0    amLODIPine (NORVASC) 5 MG tablet TAKE ONE TABLET BY MOUTH ONE TIME DAILY (MUST MAKE APPT FOR FURTHER REFILLS) 90 tablet 0    omeprazole (PRILOSEC) 20 MG delayed release capsule TAKE ONE CAPSULE BY MOUTH TWICE DAILY 180 capsule 1    methylphenidate (CONCERTA) 36 MG extended release tablet Take 2 tablets by mouth every morning for 30 days. 60 tablet 0    FLUoxetine (PROZAC) 20 MG capsule Take 3 capsules by mouth daily Take 3 capsules by mouth daily 270 capsule 1    ARIPiprazole (ABILIFY) 15 MG tablet Take 1 tablet by mouth daily 90 tablet 1    traZODone (DESYREL) 100 MG tablet Take 2 tablets by mouth nightly 60 tablet 2     No current facility-administered medications for this visit. No Known Allergies    Health Maintenance   Topic Date Due    COVID-19 Vaccine (1) Never done    HIV screen  Never done    Hepatitis C screen  Never done    DTaP/Tdap/Td vaccine (1 - Tdap) Never done    Diabetes screen  Never done    Lipids  Never done    Flu vaccine (1) 08/01/2022    Colorectal Cancer Screen  11/18/2022    Depression Monitoring  12/05/2023    Hepatitis A vaccine  Aged Out    Hib vaccine  Aged Out    Meningococcal (ACWY) vaccine  Aged Out    Pneumococcal 0-64 years Vaccine  Aged Out       :     Review of Systems   Constitutional:  Negative for chills, fatigue and fever. HENT:  Negative for ear discharge, ear pain, sinus pressure, sinus pain, sore throat and trouble swallowing. Eyes:  Negative for discharge, redness and itching. Respiratory:  Negative for cough, chest tightness, shortness of breath and wheezing. Cardiovascular:  Negative for chest pain. Gastrointestinal:  Negative for abdominal pain, diarrhea, nausea and vomiting. Genitourinary:  Negative for difficulty urinating. Musculoskeletal:  Negative for arthralgias and neck pain. Skin:  Negative for rash. Neurological:  Negative for dizziness, weakness, light-headedness and headaches. Psychiatric/Behavioral:          Depression    All other systems reviewed and are negative. Objective:     Physical Exam  Constitutional:       Appearance: Normal appearance. He is normal weight. HENT:      Head: Normocephalic and atraumatic. Nose: Nose normal.   Eyes:      Extraocular Movements: Extraocular movements intact. Conjunctiva/sclera: Conjunctivae normal.      Pupils: Pupils are equal, round, and reactive to light. Cardiovascular:      Rate and Rhythm: Normal rate and regular rhythm. Pulses: Normal pulses. Heart sounds: Normal heart sounds. Pulmonary:      Effort: Pulmonary effort is normal.      Breath sounds: Normal breath sounds. Abdominal:      General: Abdomen is flat. Palpations: Abdomen is soft. Musculoskeletal:         General: Normal range of motion. Cervical back: Neck supple. Skin:     General: Skin is warm and dry. Capillary Refill: Capillary refill takes less than 2 seconds. Neurological:      General: No focal deficit present. Mental Status: He is alert and oriented to person, place, and time. Psychiatric:         Mood and Affect: Mood normal.     /80 (Site: Left Upper Arm, Position: Sitting, Cuff Size: Large Adult)   Pulse (!) 105   Resp 16   Ht 6' 3\" (1.905 m)   Wt 280 lb 6.4 oz (127.2 kg)   SpO2 96%   BMI 35.05 kg/m²     Assessment:       Diagnosis Orders   1. Need for influenza vaccination  Influenza, FLUCELVAX, (age 10 mo+), IM, Preservative Free, 0.5 mL      2. Encounter for lipid screening for cardiovascular disease  Lipid Panel      3. Essential hypertension        4. Attention deficit hyperactivity disorder (ADHD), unspecified ADHD type        5. Recurrent major depressive disorder, remission status unspecified ([facility]            :      Return in about 3 months (around 3/5/2023) for adhd and depression follow up. 1.  ADHD  Continue with Concerta 72 mg every morning. He is going to call around different pharmacies and let us know who has it in stock  2. Need for influenza vaccine  Flu shot given today  3. Lipid screening  Rx for lab work  4. Hypertension  Blood pressure stable today  Continue with amlodipine 5 mg daily  5. Depression  Patient did score high on depression screen. No thoughts of harming himself or others.   He was given a list of psychiatry office to call. He is going to continue with counseling. Continue with fluoxetine 60 mg daily, Abilify 15 mg daily and alprazolam 0.5 mg as needed    Follow-up in 3 months or sooner as needed. He is agreeable to this plan of care    Orders Placed This Encounter   Procedures    Influenza, FLUCELVAX, (age 10 mo+), IM, Preservative Free, 0.5 mL    Lipid Panel     Standing Status:   Future     Standing Expiration Date:   12/5/2023     Order Specific Question:   Is Patient Fasting?/# of Hours     Answer:   8     Order Specific Question:   Has the patient fasted? Answer:   Yes     No orders of the defined types were placed in this encounter. Patient given educational materials - seepatient instructions. Discussed use, benefit, and side effects of prescribed medications. All patient questions answered. Pt voiced understanding. Reviewed health maintenance. Instructed to continue current medications, diet and exercise. Patient agreedwith treatment plan. Follow up as directed.       Electronically signed by DORIS Salomon CNP on 12/5/2022at 1:01 PM

## 2022-12-13 DIAGNOSIS — F90.9 ATTENTION DEFICIT HYPERACTIVITY DISORDER (ADHD), UNSPECIFIED ADHD TYPE: ICD-10-CM

## 2022-12-13 RX ORDER — METHYLPHENIDATE HYDROCHLORIDE 36 MG/1
72 TABLET ORAL EVERY MORNING
Qty: 60 TABLET | Refills: 0 | Status: SHIPPED | OUTPATIENT
Start: 2022-12-13 | End: 2023-01-12

## 2022-12-20 RX ORDER — TRAZODONE HYDROCHLORIDE 100 MG/1
200 TABLET ORAL NIGHTLY
Qty: 60 TABLET | Refills: 2 | Status: SHIPPED | OUTPATIENT
Start: 2022-12-20

## 2022-12-30 DIAGNOSIS — G47.00 INSOMNIA, UNSPECIFIED TYPE: ICD-10-CM

## 2022-12-30 RX ORDER — ALPRAZOLAM 0.5 MG/1
TABLET ORAL
Qty: 60 TABLET | Refills: 0 | Status: SHIPPED | OUTPATIENT
Start: 2022-12-30 | End: 2023-01-29

## 2023-01-11 ENCOUNTER — PATIENT MESSAGE (OUTPATIENT)
Dept: PRIMARY CARE CLINIC | Age: 48
End: 2023-01-11

## 2023-01-11 DIAGNOSIS — F90.9 ATTENTION DEFICIT HYPERACTIVITY DISORDER (ADHD), UNSPECIFIED ADHD TYPE: ICD-10-CM

## 2023-01-11 RX ORDER — METHYLPHENIDATE HYDROCHLORIDE 36 MG/1
72 TABLET ORAL EVERY MORNING
Qty: 60 TABLET | Refills: 0 | Status: SHIPPED | OUTPATIENT
Start: 2023-01-11 | End: 2023-02-10

## 2023-01-11 NOTE — TELEPHONE ENCOUNTER
From: Roddy Martin  To: Lila Chowdary  Sent: 1/11/2023 12:02 PM EST  Subject: Urgent Concerta Refill    Hello,    I have 3 days left of my Concerta. I just called [address] at [facility] in [facility] to see if they had it in stock, and they do. I dont expect that to remain the case. If possible, could you please send the prescription refill there today? Thank you very much.     Roddy Martin

## 2023-01-27 ENCOUNTER — PATIENT MESSAGE (OUTPATIENT)
Dept: PRIMARY CARE CLINIC | Age: 48
End: 2023-01-27

## 2023-01-27 DIAGNOSIS — R53.83 OTHER FATIGUE: Primary | ICD-10-CM

## 2023-01-27 NOTE — TELEPHONE ENCOUNTER
From: Henrietta Castaneda  To: Nolvia Loaiza  Sent: 1/27/2023 12:16 PM EST  Subject: Blood Test Question    Zoran Copeland:    I am going to get bloodwork done next week as per your request. I would very much like to check my testosterone levels. Can you add that test to the ones you already have me set up for? I would really appreciate it because I suffer from extremely low energy. Its actually debilitating. I realize that the depression is playing a big role in this, but I am curious to see if there is something more at play.      Thank You very much    Henrietta Castaneda

## 2023-01-31 ENCOUNTER — PATIENT MESSAGE (OUTPATIENT)
Dept: PRIMARY CARE CLINIC | Age: 48
End: 2023-01-31

## 2023-01-31 DIAGNOSIS — G47.00 INSOMNIA, UNSPECIFIED TYPE: ICD-10-CM

## 2023-01-31 RX ORDER — ALPRAZOLAM 0.5 MG/1
TABLET ORAL
Qty: 60 TABLET | Refills: 0 | Status: SHIPPED | OUTPATIENT
Start: 2023-01-31 | End: 2023-03-02

## 2023-01-31 NOTE — TELEPHONE ENCOUNTER
From: Dustin Ridley  To: Pamellashazia Zelaya  Sent: 1/31/2023 5:54 AM EST  Subject: Xanax    Hello:    I am completely out of Xanax. Please send a refill to [facility]. Thank you.     Arlene Haile

## 2023-03-02 ENCOUNTER — HOSPITAL ENCOUNTER (OUTPATIENT)
Age: 48
Discharge: HOME | End: 2023-03-02
Payer: MEDICAID

## 2023-03-02 DIAGNOSIS — Z13.6 ENCOUNTER FOR LIPID SCREENING FOR CARDIOVASCULAR DISEASE: ICD-10-CM

## 2023-03-02 DIAGNOSIS — E53.8 VITAMIN B 12 DEFICIENCY: ICD-10-CM

## 2023-03-02 DIAGNOSIS — E55.9 VITAMIN D DEFICIENCY: ICD-10-CM

## 2023-03-02 DIAGNOSIS — Z13.0 SCREENING FOR DEFICIENCY ANEMIA: ICD-10-CM

## 2023-03-02 DIAGNOSIS — Z13.29 SCREENING FOR THYROID DISORDER: ICD-10-CM

## 2023-03-02 DIAGNOSIS — R53.83 OTHER FATIGUE: ICD-10-CM

## 2023-03-02 DIAGNOSIS — Z13.220 ENCOUNTER FOR LIPID SCREENING FOR CARDIOVASCULAR DISEASE: ICD-10-CM

## 2023-03-02 DIAGNOSIS — Z13.1 SCREENING FOR DIABETES MELLITUS: ICD-10-CM

## 2023-03-02 LAB
25(OH)D3 SERPL-MCNC: 27.2 NG/ML
ALBUMIN SERPL-MCNC: 4.3 G/DL (ref 3.5–5.2)
ALBUMIN/GLOBULIN RATIO: 1.7 (ref 1–2.5)
ALP SERPL-CCNC: 96 U/L (ref 40–129)
ALT SERPL-CCNC: 21 U/L (ref 5–41)
ANION GAP SERPL CALCULATED.3IONS-SCNC: 13 MMOL/L (ref 9–17)
AST SERPL-CCNC: 17 U/L
BILIRUB SERPL-MCNC: 0.5 MG/DL (ref 0.3–1.2)
BUN SERPL-MCNC: 15 MG/DL (ref 6–20)
CALCIUM SERPL-MCNC: 9.3 MG/DL (ref 8.6–10.4)
CHLORIDE SERPL-SCNC: 105 MMOL/L (ref 98–107)
CHOLEST SERPL-MCNC: 180 MG/DL
CHOLESTEROL/HDL RATIO: 6.9
CO2 SERPL-SCNC: 24 MMOL/L (ref 20–31)
CREAT SERPL-MCNC: 1.03 MG/DL (ref 0.7–1.2)
EST. AVERAGE GLUCOSE BLD GHB EST-MCNC: 97 MG/DL
FOLATE SERPL-MCNC: 7.2 NG/ML
GFR SERPL CREATININE-BSD FRML MDRD: >60 ML/MIN/1.73M2
GLUCOSE SERPL-MCNC: 94 MG/DL (ref 70–99)
HBA1C MFR BLD: 5 % (ref 4–6)
HCT VFR BLD AUTO: 46.6 % (ref 40.7–50.3)
HDLC SERPL-MCNC: 26 MG/DL
HGB BLD-MCNC: 15.8 G/DL (ref 13–17)
LDLC SERPL CALC-MCNC: ABNORMAL MG/DL (ref 0–130)
LDLC SERPL DIRECT ASSAY-MCNC: 88 MG/DL
MCH RBC QN AUTO: 29.3 PG (ref 25.2–33.5)
MCHC RBC AUTO-ENTMCNC: 33.9 G/DL (ref 28.4–34.8)
MCV RBC AUTO: 86.5 FL (ref 82.6–102.9)
NRBC AUTOMATED: 0 PER 100 WBC
PDW BLD-RTO: 13.1 % (ref 11.8–14.4)
PLATELET # BLD AUTO: 336 K/UL (ref 138–453)
PMV BLD AUTO: 9.9 FL (ref 8.1–13.5)
POTASSIUM SERPL-SCNC: 4.5 MMOL/L (ref 3.7–5.3)
PROT SERPL-MCNC: 6.8 G/DL (ref 6.4–8.3)
RBC # BLD: 5.39 M/UL (ref 4.21–5.77)
SHBG SERPL-SCNC: 40 NMOL/L (ref 11–80)
SODIUM SERPL-SCNC: 142 MMOL/L (ref 135–144)
TESTOST FREE MFR SERPL: 74 PG/ML (ref 47–244)
TESTOST SERPL-MCNC: 409 NG/DL (ref 220–1000)
TRIGL SERPL-MCNC: 406 MG/DL
TSH SERPL-ACNC: 1.84 UIU/ML (ref 0.3–5)
VIT B12 SERPL-MCNC: 607 PG/ML (ref 232–1245)
WBC # BLD AUTO: 7.9 K/UL (ref 3.5–11.3)

## 2023-03-02 PROCEDURE — 84443 ASSAY THYROID STIM HORMONE: CPT

## 2023-03-02 PROCEDURE — 82306 VITAMIN D 25 HYDROXY: CPT

## 2023-03-02 PROCEDURE — 36415 COLL VENOUS BLD VENIPUNCTURE: CPT

## 2023-03-02 PROCEDURE — 80053 COMPREHEN METABOLIC PANEL: CPT

## 2023-03-02 PROCEDURE — 84403 ASSAY OF TOTAL TESTOSTERONE: CPT

## 2023-03-02 PROCEDURE — 83721 ASSAY OF BLOOD LIPOPROTEIN: CPT

## 2023-03-02 PROCEDURE — 82607 VITAMIN B-12: CPT

## 2023-03-02 PROCEDURE — 82746 ASSAY OF FOLIC ACID SERUM: CPT

## 2023-03-02 PROCEDURE — 80061 LIPID PANEL: CPT

## 2023-03-02 PROCEDURE — 85027 COMPLETE CBC AUTOMATED: CPT

## 2023-03-02 PROCEDURE — 83036 HEMOGLOBIN GLYCOSYLATED A1C: CPT

## 2023-03-02 PROCEDURE — 84270 ASSAY OF SEX HORMONE GLOBUL: CPT

## 2023-03-05 SDOH — ECONOMIC STABILITY: INCOME INSECURITY: HOW HARD IS IT FOR YOU TO PAY FOR THE VERY BASICS LIKE FOOD, HOUSING, MEDICAL CARE, AND HEATING?: NOT HARD AT ALL

## 2023-03-05 SDOH — ECONOMIC STABILITY: FOOD INSECURITY: WITHIN THE PAST 12 MONTHS, YOU WORRIED THAT YOUR FOOD WOULD RUN OUT BEFORE YOU GOT MONEY TO BUY MORE.: NEVER TRUE

## 2023-03-05 SDOH — ECONOMIC STABILITY: HOUSING INSECURITY
IN THE LAST 12 MONTHS, WAS THERE A TIME WHEN YOU DID NOT HAVE A STEADY PLACE TO SLEEP OR SLEPT IN A SHELTER (INCLUDING NOW)?: NO

## 2023-03-05 SDOH — ECONOMIC STABILITY: FOOD INSECURITY: WITHIN THE PAST 12 MONTHS, THE FOOD YOU BOUGHT JUST DIDN'T LAST AND YOU DIDN'T HAVE MONEY TO GET MORE.: NEVER TRUE

## 2023-03-06 ENCOUNTER — OFFICE VISIT (OUTPATIENT)
Dept: PRIMARY CARE CLINIC | Age: 48
End: 2023-03-06
Payer: MEDICAID

## 2023-03-06 VITALS
RESPIRATION RATE: 20 BRPM | WEIGHT: 283 LBS | HEART RATE: 96 BPM | SYSTOLIC BLOOD PRESSURE: 118 MMHG | BODY MASS INDEX: 35.37 KG/M2 | OXYGEN SATURATION: 98 % | DIASTOLIC BLOOD PRESSURE: 82 MMHG

## 2023-03-06 DIAGNOSIS — R79.89 LOW TESTOSTERONE: ICD-10-CM

## 2023-03-06 DIAGNOSIS — K21.9 GASTROESOPHAGEAL REFLUX DISEASE WITHOUT ESOPHAGITIS: ICD-10-CM

## 2023-03-06 DIAGNOSIS — F90.9 ATTENTION DEFICIT HYPERACTIVITY DISORDER (ADHD), UNSPECIFIED ADHD TYPE: ICD-10-CM

## 2023-03-06 DIAGNOSIS — Z12.11 SCREENING FOR COLON CANCER: ICD-10-CM

## 2023-03-06 DIAGNOSIS — I10 ESSENTIAL HYPERTENSION: Primary | ICD-10-CM

## 2023-03-06 DIAGNOSIS — G47.00 INSOMNIA, UNSPECIFIED TYPE: ICD-10-CM

## 2023-03-06 DIAGNOSIS — E78.1 HYPERTRIGLYCERIDEMIA: ICD-10-CM

## 2023-03-06 DIAGNOSIS — F25.1 SCHIZOAFFECTIVE DISORDER, DEPRESSIVE TYPE (HCC): ICD-10-CM

## 2023-03-06 PROCEDURE — G8482 FLU IMMUNIZE ORDER/ADMIN: HCPCS | Performed by: NURSE PRACTITIONER

## 2023-03-06 PROCEDURE — 3079F DIAST BP 80-89 MM HG: CPT | Performed by: NURSE PRACTITIONER

## 2023-03-06 PROCEDURE — 1036F TOBACCO NON-USER: CPT | Performed by: NURSE PRACTITIONER

## 2023-03-06 PROCEDURE — 3074F SYST BP LT 130 MM HG: CPT | Performed by: NURSE PRACTITIONER

## 2023-03-06 PROCEDURE — 99214 OFFICE O/P EST MOD 30 MIN: CPT | Performed by: NURSE PRACTITIONER

## 2023-03-06 PROCEDURE — G8427 DOCREV CUR MEDS BY ELIG CLIN: HCPCS | Performed by: NURSE PRACTITIONER

## 2023-03-06 PROCEDURE — G8417 CALC BMI ABV UP PARAM F/U: HCPCS | Performed by: NURSE PRACTITIONER

## 2023-03-06 RX ORDER — FENOFIBRATE 145 MG/1
145 TABLET, COATED ORAL DAILY
Qty: 90 TABLET | Refills: 1 | Status: SHIPPED | OUTPATIENT
Start: 2023-03-06

## 2023-03-06 RX ORDER — AMLODIPINE BESYLATE 5 MG/1
TABLET ORAL
Qty: 90 TABLET | Refills: 1 | Status: SHIPPED | OUTPATIENT
Start: 2023-03-06

## 2023-03-06 RX ORDER — OMEPRAZOLE 20 MG/1
CAPSULE, DELAYED RELEASE ORAL
Qty: 180 CAPSULE | Refills: 1 | Status: SHIPPED | OUTPATIENT
Start: 2023-03-06

## 2023-03-06 ASSESSMENT — COLUMBIA-SUICIDE SEVERITY RATING SCALE - C-SSRS
6. HAVE YOU EVER DONE ANYTHING, STARTED TO DO ANYTHING, OR PREPARED TO DO ANYTHING TO END YOUR LIFE?: NO
2. HAVE YOU ACTUALLY HAD ANY THOUGHTS OF KILLING YOURSELF?: NO
1. WITHIN THE PAST MONTH, HAVE YOU WISHED YOU WERE DEAD OR WISHED YOU COULD GO TO SLEEP AND NOT WAKE UP?: NO

## 2023-03-06 ASSESSMENT — ENCOUNTER SYMPTOMS
CHEST TIGHTNESS: 0
COUGH: 0
DIARRHEA: 0
EYE REDNESS: 0
SINUS PRESSURE: 0
VOMITING: 0
SINUS PAIN: 0
SORE THROAT: 0
ABDOMINAL PAIN: 0
TROUBLE SWALLOWING: 0
EYE ITCHING: 0
WHEEZING: 0
SHORTNESS OF BREATH: 0
EYE DISCHARGE: 0
NAUSEA: 0

## 2023-03-06 ASSESSMENT — PATIENT HEALTH QUESTIONNAIRE - PHQ9
1. LITTLE INTEREST OR PLEASURE IN DOING THINGS: 3
SUM OF ALL RESPONSES TO PHQ QUESTIONS 1-9: 27
SUM OF ALL RESPONSES TO PHQ9 QUESTIONS 1 & 2: 6
9. THOUGHTS THAT YOU WOULD BE BETTER OFF DEAD, OR OF HURTING YOURSELF: 3
SUM OF ALL RESPONSES TO PHQ QUESTIONS 1-9: 27
3. TROUBLE FALLING OR STAYING ASLEEP: 3
6. FEELING BAD ABOUT YOURSELF - OR THAT YOU ARE A FAILURE OR HAVE LET YOURSELF OR YOUR FAMILY DOWN: 3
SUM OF ALL RESPONSES TO PHQ QUESTIONS 1-9: 24
SUM OF ALL RESPONSES TO PHQ QUESTIONS 1-9: 27
8. MOVING OR SPEAKING SO SLOWLY THAT OTHER PEOPLE COULD HAVE NOTICED. OR THE OPPOSITE, BEING SO FIGETY OR RESTLESS THAT YOU HAVE BEEN MOVING AROUND A LOT MORE THAN USUAL: 3
2. FEELING DOWN, DEPRESSED OR HOPELESS: 3
7. TROUBLE CONCENTRATING ON THINGS, SUCH AS READING THE NEWSPAPER OR WATCHING TELEVISION: 3
4. FEELING TIRED OR HAVING LITTLE ENERGY: 3
10. IF YOU CHECKED OFF ANY PROBLEMS, HOW DIFFICULT HAVE THESE PROBLEMS MADE IT FOR YOU TO DO YOUR WORK, TAKE CARE OF THINGS AT HOME, OR GET ALONG WITH OTHER PEOPLE: 2
5. POOR APPETITE OR OVEREATING: 3

## 2023-03-06 NOTE — PROGRESS NOTES
[facility] PRIMARY CARE  [facility] [address] [place]  [address]  [address] [address]  Dept: [phone]  Dept Fax: [phone]    Vilma  is a 41 y.o. male who presents today for his medical conditions/complaintsas noted below. Vilma  is c/o of ADHD (3 mo f/u), Depression (3 mo f/u), and Discuss Labs        HPI:     Patient presents for 3-month follow-up  Blood pressure stable  Weight is up 3 pounds since last visit    Patient presents for 3-month follow-up  He has a psychiatrist now. Its at [facility]. Dr. Oscar Christianson. This seem to be going ok. Recommend a sun lamp for the winter months. He does admit to emotional eating  Lab work done prior to his appointment  He has no new concerns today      Past Medical History:   Diagnosis Date    Acid reflux     ADHD (attention deficit hyperactivity disorder)     Anxiety     Depression     Insomnia       Past Surgical History:   Procedure Laterality Date    APPENDECTOMY      CHOLECYSTECTOMY         Family History   Problem Relation Age of Onset    Breast Cancer Mother     Cancer Father     Heart Disease Father        Social History     Tobacco Use    Smoking status: Never    Smokeless tobacco: Never   Substance Use Topics    Alcohol use: Not Currently      Current Outpatient Medications   Medication Sig Dispense Refill    amLODIPine (NORVASC) 5 MG tablet TAKE ONE TABLET BY MOUTH ONE TIME DAILY (MUST MAKE APPT FOR FURTHER REFILLS) 90 tablet 1    omeprazole (PRILOSEC) 20 MG delayed release capsule TAKE ONE CAPSULE BY MOUTH TWICE DAILY 180 capsule 1    fenofibrate (TRICOR) 145 MG tablet Take 1 tablet by mouth daily 90 tablet 1    methylphenidate (CONCERTA) 36 MG extended release tablet Take 2 tablets by mouth every morning for 30 days.  60 tablet 0    traZODone (DESYREL) 100 MG tablet Take 2 tablets by mouth nightly 60 tablet 2    FLUoxetine (PROZAC) 20 MG capsule Take 3 capsules by mouth daily Take 3 capsules by mouth daily (Patient taking differently: Take 40 mg by mouth daily Take 2 capsules by mouth daily) 270 capsule 1    ARIPiprazole (ABILIFY) 15 MG tablet Take 1 tablet by mouth daily 90 tablet 1     No current facility-administered medications for this visit.     No Known Allergies    Health Maintenance   Topic Date Due    COVID-19 Vaccine (1) Never done    HIV screen  Never done    Hepatitis C screen  Never done    DTaP/Tdap/Td vaccine (1 - Tdap) Never done    Colorectal Cancer Screen  Never done    Depression Monitoring  12/05/2023    Lipids  03/02/2028    Flu vaccine  Completed    Hepatitis A vaccine  Aged Out    Hib vaccine  Aged Out    Meningococcal (ACWY) vaccine  Aged Out    Pneumococcal 0-64 years Vaccine  Aged Out       :     Review of Systems   Constitutional:  Negative for chills, fatigue and fever.   HENT:  Negative for ear discharge, ear pain, sinus pressure, sinus pain, sore throat and trouble swallowing.    Eyes:  Negative for discharge, redness and itching.   Respiratory:  Negative for cough, chest tightness, shortness of breath and wheezing.    Cardiovascular:  Negative for chest pain.   Gastrointestinal:  Negative for abdominal pain, diarrhea, nausea and vomiting.   Genitourinary:  Negative for difficulty urinating.   Musculoskeletal:  Negative for arthralgias and neck pain.   Skin:  Negative for rash.   Neurological:  Negative for dizziness, weakness, light-headedness and headaches.   All other systems reviewed and are negative.    Objective:     Physical Exam  Constitutional:       Appearance: Normal appearance. He is normal weight.   HENT:      Head: Normocephalic and atraumatic.      Nose: Nose normal.   Eyes:      Extraocular Movements: Extraocular movements intact.      Conjunctiva/sclera: Conjunctivae normal.      Pupils: Pupils are equal, round, and reactive to light.   Cardiovascular:      Rate and Rhythm: Normal rate and regular rhythm.      Pulses: Normal pulses.      Heart sounds: Normal heart sounds.  Pulmonary:      Effort: Pulmonary effort is normal.      Breath sounds: Normal breath sounds. Abdominal:      General: Abdomen is flat. Palpations: Abdomen is soft. Musculoskeletal:         General: Normal range of motion. Cervical back: Neck supple. Skin:     General: Skin is warm and dry. Capillary Refill: Capillary refill takes less than 2 seconds. Neurological:      General: No focal deficit present. Mental Status: He is alert and oriented to person, place, and time. Psychiatric:         Mood and Affect: Mood normal.     /82   Pulse 96   Resp 20   Wt 283 lb (128.4 kg)   SpO2 98%   BMI 35.37 kg/m²     Assessment:       Diagnosis Orders   1. Essential hypertension  amLODIPine (NORVASC) 5 MG tablet      2. Gastroesophageal reflux disease without esophagitis  omeprazole (PRILOSEC) 20 MG delayed release capsule      3. Screening for colon cancer  Fecal DNA Colorectal cancer screening ([facility]      4. Giana José MD, Urology, [place]      5. Hypertriglyceridemia  Lipid Panel      6. Attention deficit hyperactivity disorder (ADHD), unspecified ADHD type        7. Schizoaffective disorder, depressive type ([facility]        8. Insomnia, unspecified type            :      Return in about 15 weeks (around 6/19/2023) for lipid follow up. 1.  Hypertension  Blood pressure stable  Continue with amlodipine 5 mg daily  2. GERD  Doing well with omeprazole. Rx refill for omeprazole 20 mg twice a day  3. Colon cancer screening  Agreeable to [place]. Order placed  4. Testosterone  Discussed testosterone level. He would like to speak to urology regarding further work-up and intervention  5. Hypertriglyceridemia  Discussed lab work  Agreeable to medication  Rx for fenofibrate 145 mg daily  Discussed diet changes  Repeat lab work in 3 months  6-8.   ADHD, schizoaffective disorder and insomnia  Follow with psychiatry  Currently on trazodone 200 mg nightly, Concerta 72 mg daily, Abilify 15 mg daily    Follow-up in 3 months. May return sooner if needed  Orders Placed This Encounter   Procedures    Fecal DNA Colorectal cancer screening ([facility])    Lipid Panel     Standing Status:   Future     Standing Expiration Date:   3/6/2024     Order Specific Question:   Is Patient Fasting?/# of Hours     Answer:   8     Order Specific Question:   Has the patient fasted? Answer: Yes    Germain Murphy MD, Urology, [facility]     Referral Priority:   Routine     Referral Type:   Eval and Treat     Referral Reason:   Specialty Services Required     Referred to Provider:   Jorge Bowen MD     Requested Specialty:   Urology     Number of Visits Requested:   1     Orders Placed This Encounter   Medications    amLODIPine (NORVASC) 5 MG tablet     Sig: TAKE ONE TABLET BY MOUTH ONE TIME DAILY (MUST MAKE APPT FOR FURTHER REFILLS)     Dispense:  90 tablet     Refill:  1    omeprazole (PRILOSEC) 20 MG delayed release capsule     Sig: TAKE ONE CAPSULE BY MOUTH TWICE DAILY     Dispense:  180 capsule     Refill:  1    fenofibrate (TRICOR) 145 MG tablet     Sig: Take 1 tablet by mouth daily     Dispense:  90 tablet     Refill:  1       Patient given educational materials - seepatient instructions. Discussed use, benefit, and side effects of prescribed medications. All patient questions answered. Pt voiced understanding. Reviewed health maintenance. Instructed to continue current medications, diet and exercise. Patient agreedwith treatment plan. Follow up as directed.       Electronically signed by DORIS Alas CNP on 3/6/2023at 12:12 PM

## 2023-04-07 LAB — NONINV COLON CA DNA+OCC BLD SCRN STL QL: NEGATIVE

## 2023-05-19 RX ORDER — TRAZODONE HYDROCHLORIDE 100 MG/1
TABLET ORAL
Qty: 60 TABLET | Refills: 0 | Status: SHIPPED | OUTPATIENT
Start: 2023-05-19

## 2023-05-22 ENCOUNTER — TELEPHONE (OUTPATIENT)
Dept: PRIMARY CARE CLINIC | Age: 48
End: 2023-05-22

## 2023-05-22 DIAGNOSIS — F90.9 ATTENTION DEFICIT HYPERACTIVITY DISORDER (ADHD), UNSPECIFIED ADHD TYPE: ICD-10-CM

## 2023-05-22 RX ORDER — METHYLPHENIDATE HYDROCHLORIDE 36 MG/1
72 TABLET ORAL EVERY MORNING
Qty: 60 TABLET | Refills: 0 | Status: CANCELLED | OUTPATIENT
Start: 2023-05-22 | End: 2023-06-21

## 2023-05-22 NOTE — TELEPHONE ENCOUNTER
Patient called into office, states that he is unable to get in contact with his psychiatrist Demarcus Bass at [facility] for a refill on his methylphenidate (concerta) he is asking if you would be able to prescribe this for him. He wanted to give you a heads up, and states that he will be sending you a mychart message explaining in more detail. He does not know which pharmacy has this medication in stock yet, so he will be letting the office know.

## 2023-05-23 ENCOUNTER — PATIENT MESSAGE (OUTPATIENT)
Dept: PRIMARY CARE CLINIC | Age: 48
End: 2023-05-23

## 2023-05-23 DIAGNOSIS — F90.9 ATTENTION DEFICIT HYPERACTIVITY DISORDER (ADHD), UNSPECIFIED ADHD TYPE: ICD-10-CM

## 2023-05-23 RX ORDER — METHYLPHENIDATE HYDROCHLORIDE 36 MG/1
72 TABLET ORAL EVERY MORNING
Qty: 60 TABLET | Refills: 0 | Status: SHIPPED | OUTPATIENT
Start: 2023-05-23 | End: 2023-06-22

## 2023-05-23 NOTE — TELEPHONE ENCOUNTER
From: Peri Puga  To: Rohit Gala  Sent: 5/23/2023 1:23 PM EDT  Subject: Concerns Re Meds    Hi Lilly Bright: I have been seeing Humberto Henderson MD at [facility] for my psychiatric meds. Unfortunately, he has become unreliable in regards to getting my Concerta/Methylphenidate on time. I have none left. I take two 36 mg pills per day. For context, this started with him mis-prescribing the med on May 11. He put in a prescription to [facility] for 25 (36mg) pills which gave me 12.5 days worth. I informed him of the mistake the next day via [employer] but he didnt get back to me. Then last Friday I sent another message explaining the situation to him again and told him I was worried I wouldnt be able to get it in time because the med is hard to get. But, he didnt respond to that either. Yesterday I actually called his personal cell number which he gave to me. Id been reluctant to call him because I didnt want to disturb him. When I called he assured me that the situation would be resolved. I called him two other times yesterday. All three times he said hed get it done. Today I still dont have   the med and dont know where things stand. I havent heard from him and am uncomfortable calling him at this point. Im just wondering if youd be willing to prescribe my psych meds. Sorry for the long message.  Thank you, Víctor Espinosa

## 2023-05-30 DIAGNOSIS — F25.1 SCHIZOAFFECTIVE DISORDER, DEPRESSIVE TYPE (HCC): ICD-10-CM

## 2023-05-30 RX ORDER — ARIPIPRAZOLE 15 MG/1
15 TABLET ORAL DAILY
Qty: 90 TABLET | Refills: 1 | Status: SHIPPED | OUTPATIENT
Start: 2023-05-30 | End: 2023-08-28

## 2023-06-19 ENCOUNTER — TELEPHONE (OUTPATIENT)
Dept: PRIMARY CARE CLINIC | Age: 48
End: 2023-06-19

## 2023-06-19 ENCOUNTER — OFFICE VISIT (OUTPATIENT)
Dept: PRIMARY CARE CLINIC | Age: 48
End: 2023-06-19
Payer: MEDICAID

## 2023-06-19 VITALS
OXYGEN SATURATION: 97 % | SYSTOLIC BLOOD PRESSURE: 114 MMHG | RESPIRATION RATE: 14 BRPM | WEIGHT: 266 LBS | DIASTOLIC BLOOD PRESSURE: 86 MMHG | HEART RATE: 102 BPM | BODY MASS INDEX: 33.25 KG/M2

## 2023-06-19 DIAGNOSIS — F25.1 SCHIZOAFFECTIVE DISORDER, DEPRESSIVE TYPE (HCC): Primary | ICD-10-CM

## 2023-06-19 DIAGNOSIS — F90.9 ATTENTION DEFICIT HYPERACTIVITY DISORDER (ADHD), UNSPECIFIED ADHD TYPE: ICD-10-CM

## 2023-06-19 DIAGNOSIS — I10 ESSENTIAL HYPERTENSION: ICD-10-CM

## 2023-06-19 DIAGNOSIS — E78.1 HYPERTRIGLYCERIDEMIA: ICD-10-CM

## 2023-06-19 PROCEDURE — G8427 DOCREV CUR MEDS BY ELIG CLIN: HCPCS | Performed by: NURSE PRACTITIONER

## 2023-06-19 PROCEDURE — 3079F DIAST BP 80-89 MM HG: CPT | Performed by: NURSE PRACTITIONER

## 2023-06-19 PROCEDURE — G8417 CALC BMI ABV UP PARAM F/U: HCPCS | Performed by: NURSE PRACTITIONER

## 2023-06-19 PROCEDURE — 99214 OFFICE O/P EST MOD 30 MIN: CPT | Performed by: NURSE PRACTITIONER

## 2023-06-19 PROCEDURE — 3074F SYST BP LT 130 MM HG: CPT | Performed by: NURSE PRACTITIONER

## 2023-06-19 PROCEDURE — 1036F TOBACCO NON-USER: CPT | Performed by: NURSE PRACTITIONER

## 2023-06-19 RX ORDER — METHYLPHENIDATE HYDROCHLORIDE 36 MG/1
72 TABLET ORAL EVERY MORNING
Qty: 60 TABLET | Refills: 0 | Status: SHIPPED | OUTPATIENT
Start: 2023-06-19 | End: 2023-07-19

## 2023-06-19 RX ORDER — TRAZODONE HYDROCHLORIDE 100 MG/1
TABLET ORAL
Qty: 60 TABLET | Refills: 2 | Status: SHIPPED | OUTPATIENT
Start: 2023-06-19

## 2023-06-19 RX ORDER — ALPRAZOLAM 0.5 MG/1
0.5 TABLET ORAL NIGHTLY
COMMUNITY
Start: 2023-05-30

## 2023-06-19 RX ORDER — METHYLPHENIDATE HYDROCHLORIDE 36 MG/1
72 TABLET ORAL EVERY MORNING
Qty: 60 TABLET | Refills: 0 | Status: SHIPPED | OUTPATIENT
Start: 2023-06-19 | End: 2023-06-19 | Stop reason: SDUPTHER

## 2023-06-19 RX ORDER — TRAZODONE HYDROCHLORIDE 100 MG/1
TABLET ORAL
Qty: 60 TABLET | Refills: 0 | Status: CANCELLED | OUTPATIENT
Start: 2023-06-19

## 2023-06-19 ASSESSMENT — ENCOUNTER SYMPTOMS
SINUS PAIN: 0
SINUS PRESSURE: 0
EYE ITCHING: 0
DIARRHEA: 0
NAUSEA: 0
TROUBLE SWALLOWING: 0
CHEST TIGHTNESS: 0
EYE DISCHARGE: 0
SHORTNESS OF BREATH: 0
COUGH: 0
VOMITING: 0
EYE REDNESS: 0
ABDOMINAL PAIN: 0
WHEEZING: 0
SORE THROAT: 0

## 2023-06-19 NOTE — TELEPHONE ENCOUNTER
YANN called, PCP sent pts medication as BELGICA for generic. Pharmacy does not have generic and states pt has been taking brand for the medication. Please resend medication to pharmacy without BELGICA restriction.

## 2023-06-19 NOTE — PROGRESS NOTES
[address] [address] [address] [facility] [address]  [address] [address]  Dept: [phone]  Dept Fax: [phone]    Fredis Lopez is a 41 y.o. male who presents today for his medical conditions/complaintsas noted below. Fredis Lopez is c/o of Hyperlipidemia (F/u)        HPI:     72-year-old male who presented to the clinic today for a 3 month hyperlipidemia follow-up appointment. Lipid panel review. Reviewed lab results with patient. There is noted improvement of the patients lipids with the addition of fenofibrate. Patient did question if he should continue to take the fenofibrate as his lipids look better. Patient was informed that he may need to remain on the medication for life. Patient stated that he has been eating better, and is more active as well. Counseled patient on continuing with his positive lifestyle changes. Blood pressure is stable. Weight stable. Patient is down 17 lbs since his last appointment. Patient stated that the Concerta that he is on has had a positive effect on his ADHD symptoms. All his other medications are going well. Needs new psychiatrist               Past Medical History:   Diagnosis Date    Acid reflux     ADHD (attention deficit hyperactivity disorder)     Anxiety     Depression     Insomnia       Past Surgical History:   Procedure Laterality Date    APPENDECTOMY      CHOLECYSTECTOMY         Family History   Problem Relation Age of Onset    Breast Cancer Mother     Cancer Father     Heart Disease Father        Social History     Tobacco Use    Smoking status: Never    Smokeless tobacco: Never   Substance Use Topics    Alcohol use: Not Currently      Current Outpatient Medications   Medication Sig Dispense Refill    ALPRAZolam (XANAX) 0.5 MG tablet Take 1 tablet by mouth at bedtime.       traZODone (DESYREL) 100 MG tablet TAKE 2 TABLETS EVERY NIGHT 60 tablet 2    ARIPiprazole (ABILIFY) 15 MG tablet Take 1 tablet by mouth

## 2023-07-20 ENCOUNTER — PATIENT MESSAGE (OUTPATIENT)
Dept: PRIMARY CARE CLINIC | Age: 48
End: 2023-07-20

## 2023-07-20 DIAGNOSIS — F90.9 ATTENTION DEFICIT HYPERACTIVITY DISORDER (ADHD), UNSPECIFIED ADHD TYPE: ICD-10-CM

## 2023-07-20 RX ORDER — METHYLPHENIDATE HYDROCHLORIDE 36 MG/1
72 TABLET ORAL EVERY MORNING
Qty: 60 TABLET | Refills: 0 | Status: CANCELLED | OUTPATIENT
Start: 2023-07-20 | End: 2023-08-19

## 2023-07-20 RX ORDER — METHYLPHENIDATE HYDROCHLORIDE 36 MG/1
72 TABLET, EXTENDED RELEASE ORAL DAILY
Qty: 60 TABLET | Refills: 0 | Status: SHIPPED | OUTPATIENT
Start: 2023-07-20 | End: 2023-08-19

## 2023-07-27 DIAGNOSIS — G47.00 INSOMNIA, UNSPECIFIED TYPE: ICD-10-CM

## 2023-07-27 RX ORDER — ALPRAZOLAM 0.5 MG/1
0.5 TABLET ORAL 2 TIMES DAILY PRN
Qty: 60 TABLET | Refills: 0 | Status: SHIPPED | OUTPATIENT
Start: 2023-07-27 | End: 2023-08-26

## 2023-08-09 ENCOUNTER — PATIENT MESSAGE (OUTPATIENT)
Dept: PRIMARY CARE CLINIC | Age: 48
End: 2023-08-09

## 2023-08-09 DIAGNOSIS — K21.9 GASTROESOPHAGEAL REFLUX DISEASE WITHOUT ESOPHAGITIS: Primary | ICD-10-CM

## 2023-08-18 ENCOUNTER — PATIENT MESSAGE (OUTPATIENT)
Dept: PRIMARY CARE CLINIC | Age: 48
End: 2023-08-18

## 2023-08-18 DIAGNOSIS — F90.9 ATTENTION DEFICIT HYPERACTIVITY DISORDER (ADHD), UNSPECIFIED ADHD TYPE: ICD-10-CM

## 2023-08-18 RX ORDER — METHYLPHENIDATE HYDROCHLORIDE 36 MG/1
72 TABLET, EXTENDED RELEASE ORAL DAILY
Qty: 60 TABLET | Refills: 0 | Status: SHIPPED | OUTPATIENT
Start: 2023-08-18 | End: 2023-09-17

## 2023-08-18 RX ORDER — TRAZODONE HYDROCHLORIDE 100 MG/1
TABLET ORAL
Qty: 60 TABLET | Refills: 2 | Status: SHIPPED | OUTPATIENT
Start: 2023-08-18

## 2023-08-18 NOTE — TELEPHONE ENCOUNTER
From: Joselito Crisostomo  To: Eddie Tinoco  Sent: 8/18/2023 9:14 AM EDT  Subject: Trazodone/Concerta    [facility],    I have 3 days' worth of Trazodone left and 4 of Concerta. Could you please send the refills to [facility]. The Concerta will almost definitely need to be written for the brand.      Thank Augie Rader

## 2023-08-28 ENCOUNTER — PATIENT MESSAGE (OUTPATIENT)
Dept: PRIMARY CARE CLINIC | Age: 48
End: 2023-08-28

## 2023-08-28 DIAGNOSIS — G47.00 INSOMNIA, UNSPECIFIED TYPE: ICD-10-CM

## 2023-08-28 DIAGNOSIS — F25.1 SCHIZOAFFECTIVE DISORDER, DEPRESSIVE TYPE (HCC): ICD-10-CM

## 2023-08-28 RX ORDER — ALPRAZOLAM 0.5 MG/1
0.5 TABLET ORAL 2 TIMES DAILY PRN
Qty: 60 TABLET | Refills: 0 | Status: SHIPPED | OUTPATIENT
Start: 2023-08-28 | End: 2023-09-27

## 2023-08-28 RX ORDER — ARIPIPRAZOLE 15 MG/1
15 TABLET ORAL DAILY
Qty: 90 TABLET | Refills: 1 | Status: SHIPPED | OUTPATIENT
Start: 2023-08-28 | End: 2023-11-26

## 2023-08-28 NOTE — TELEPHONE ENCOUNTER
From: Danny Teague  To: Radha Jerome  Sent: 8/28/2023 4:44 AM EDT  Subject: Xajox    Hello,    I will be out of Xanax by tomorrow.  Could you send a refill to [facility]?     Thanks    Danny Teague

## 2023-09-01 DIAGNOSIS — I10 ESSENTIAL HYPERTENSION: ICD-10-CM

## 2023-09-01 RX ORDER — AMLODIPINE BESYLATE 5 MG/1
TABLET ORAL
Qty: 90 TABLET | Refills: 1 | Status: SHIPPED | OUTPATIENT
Start: 2023-09-01

## 2023-09-19 ENCOUNTER — OFFICE VISIT (OUTPATIENT)
Dept: PRIMARY CARE CLINIC | Age: 48
End: 2023-09-19
Payer: MEDICAID

## 2023-09-19 VITALS
DIASTOLIC BLOOD PRESSURE: 64 MMHG | HEART RATE: 87 BPM | SYSTOLIC BLOOD PRESSURE: 100 MMHG | RESPIRATION RATE: 14 BRPM | OXYGEN SATURATION: 97 % | BODY MASS INDEX: 32.75 KG/M2 | WEIGHT: 262 LBS

## 2023-09-19 DIAGNOSIS — I10 ESSENTIAL HYPERTENSION: ICD-10-CM

## 2023-09-19 DIAGNOSIS — E78.1 HYPERTRIGLYCERIDEMIA: ICD-10-CM

## 2023-09-19 DIAGNOSIS — F25.1 SCHIZOAFFECTIVE DISORDER, DEPRESSIVE TYPE (HCC): ICD-10-CM

## 2023-09-19 DIAGNOSIS — G47.00 INSOMNIA, UNSPECIFIED TYPE: ICD-10-CM

## 2023-09-19 DIAGNOSIS — K21.9 GASTROESOPHAGEAL REFLUX DISEASE WITHOUT ESOPHAGITIS: ICD-10-CM

## 2023-09-19 DIAGNOSIS — F90.9 ATTENTION DEFICIT HYPERACTIVITY DISORDER (ADHD), UNSPECIFIED ADHD TYPE: Primary | ICD-10-CM

## 2023-09-19 PROCEDURE — G8427 DOCREV CUR MEDS BY ELIG CLIN: HCPCS | Performed by: NURSE PRACTITIONER

## 2023-09-19 PROCEDURE — 1036F TOBACCO NON-USER: CPT | Performed by: NURSE PRACTITIONER

## 2023-09-19 PROCEDURE — G8417 CALC BMI ABV UP PARAM F/U: HCPCS | Performed by: NURSE PRACTITIONER

## 2023-09-19 PROCEDURE — 99214 OFFICE O/P EST MOD 30 MIN: CPT | Performed by: NURSE PRACTITIONER

## 2023-09-19 PROCEDURE — 3078F DIAST BP <80 MM HG: CPT | Performed by: NURSE PRACTITIONER

## 2023-09-19 PROCEDURE — 3074F SYST BP LT 130 MM HG: CPT | Performed by: NURSE PRACTITIONER

## 2023-09-19 RX ORDER — METHYLPHENIDATE HYDROCHLORIDE 36 MG/1
72 TABLET, EXTENDED RELEASE ORAL DAILY
Qty: 60 TABLET | Refills: 0 | Status: SHIPPED | OUTPATIENT
Start: 2023-09-19 | End: 2023-10-19

## 2023-09-19 RX ORDER — FLUOXETINE HYDROCHLORIDE 20 MG/1
40 CAPSULE ORAL DAILY
Qty: 180 CAPSULE | Refills: 1 | Status: SHIPPED | OUTPATIENT
Start: 2023-09-19 | End: 2024-03-17

## 2023-09-19 ASSESSMENT — ENCOUNTER SYMPTOMS
SORE THROAT: 0
SINUS PRESSURE: 0
SHORTNESS OF BREATH: 0
EYE REDNESS: 0
TROUBLE SWALLOWING: 0
CHEST TIGHTNESS: 0
WHEEZING: 0
SINUS PAIN: 0
EYE DISCHARGE: 0
ABDOMINAL PAIN: 0
EYE ITCHING: 0
DIARRHEA: 0
NAUSEA: 0
COUGH: 0
VOMITING: 0

## 2023-09-19 ASSESSMENT — PATIENT HEALTH QUESTIONNAIRE - PHQ9: DEPRESSION UNABLE TO ASSESS: PT REFUSES

## 2023-09-19 NOTE — PROGRESS NOTES
CONCERTA 36 MG extended release tablet      2. Schizoaffective disorder, depressive type (HCC)  FLUoxetine (PROZAC) 20 MG capsule      3. Essential hypertension        4. Hypertriglyceridemia        5. Insomnia, unspecified type        6. Gastroesophageal reflux disease without esophagitis            :      Return in about 3 months (around 12/19/2023) for medication follow up. 1.  ADHD  Stable  We will continue with Concerta 72 mg daily  2. Schizoaffective disorder  Stable. Trying to find new psychiatrist  Currently on alprazolam 0.5 mg twice a day as needed  Abilify 15 mg daily  Fluoxetine 40 mg daily  3. Hypertension  Blood pressure is stable  He is currently on amlodipine 5 mg daily. Would consider stopping if patient continues to lose weight  4. Hypertriglyceridemia  Continue with fenofibrate 145 mg daily  5. Insomnia  Continue with trazodone 100 mg 2 tablets nightly  6. GERD  Continue with omeprazole 20 mg daily    Patient is going to follow-up in 3 months. May return sooner as needed  Orders Placed This Encounter   Medications    FLUoxetine (PROZAC) 20 MG capsule     Sig: Take 2 capsules by mouth daily Take 2 capsules by mouth daily     Dispense:  180 capsule     Refill:  1    CONCERTA 36 MG extended release tablet     Sig: Take 2 tablets by mouth daily for 30 days. Max Daily Amount: 72 mg     Dispense:  60 tablet     Refill:  0       Patient given educational materials - seepatient instructions. Discussed use, benefit, and side effects of prescribed medications. All patient questions answered. Pt voiced understanding. Reviewed health maintenance. Instructed to continue current medications, diet and exercise. Patient agreedwith treatment plan. Follow up as directed.       Electronically signed by DORIS Flores CNP on 9/19/2023at 11:23 AM

## 2023-09-26 DIAGNOSIS — G47.00 INSOMNIA, UNSPECIFIED TYPE: ICD-10-CM

## 2023-09-26 RX ORDER — ALPRAZOLAM 0.5 MG/1
0.5 TABLET ORAL 2 TIMES DAILY PRN
Qty: 60 TABLET | Refills: 0 | Status: SHIPPED | OUTPATIENT
Start: 2023-09-26 | End: 2023-10-26

## 2023-10-19 DIAGNOSIS — F90.9 ATTENTION DEFICIT HYPERACTIVITY DISORDER (ADHD), UNSPECIFIED ADHD TYPE: ICD-10-CM

## 2023-10-19 RX ORDER — METHYLPHENIDATE HYDROCHLORIDE 36 MG/1
72 TABLET, EXTENDED RELEASE ORAL DAILY
Qty: 60 TABLET | Refills: 0 | Status: SHIPPED | OUTPATIENT
Start: 2023-10-19 | End: 2023-11-18

## 2023-10-25 DIAGNOSIS — G47.00 INSOMNIA, UNSPECIFIED TYPE: ICD-10-CM

## 2023-10-25 RX ORDER — FENOFIBRATE 145 MG/1
145 TABLET, COATED ORAL DAILY
Qty: 90 TABLET | Refills: 1 | Status: SHIPPED | OUTPATIENT
Start: 2023-10-25

## 2023-10-25 RX ORDER — ALPRAZOLAM 0.5 MG/1
0.5 TABLET ORAL 2 TIMES DAILY PRN
Qty: 60 TABLET | Refills: 0 | Status: SHIPPED | OUTPATIENT
Start: 2023-10-25 | End: 2023-11-24

## 2023-10-30 DIAGNOSIS — K21.9 GASTROESOPHAGEAL REFLUX DISEASE WITHOUT ESOPHAGITIS: ICD-10-CM

## 2023-10-30 RX ORDER — OMEPRAZOLE 20 MG/1
CAPSULE, DELAYED RELEASE ORAL
Qty: 180 CAPSULE | Refills: 1 | Status: SHIPPED | OUTPATIENT
Start: 2023-10-30

## 2023-11-14 RX ORDER — TRAZODONE HYDROCHLORIDE 100 MG/1
TABLET ORAL
Qty: 60 TABLET | Refills: 0 | Status: SHIPPED | OUTPATIENT
Start: 2023-11-14

## 2023-11-17 DIAGNOSIS — F90.9 ATTENTION DEFICIT HYPERACTIVITY DISORDER (ADHD), UNSPECIFIED ADHD TYPE: ICD-10-CM

## 2023-11-17 RX ORDER — METHYLPHENIDATE HYDROCHLORIDE 36 MG/1
72 TABLET, EXTENDED RELEASE ORAL DAILY
Qty: 60 TABLET | Refills: 0 | Status: SHIPPED | OUTPATIENT
Start: 2023-11-17 | End: 2023-12-17

## 2023-11-27 ENCOUNTER — PATIENT MESSAGE (OUTPATIENT)
Dept: PRIMARY CARE CLINIC | Age: 48
End: 2023-11-27

## 2023-11-27 DIAGNOSIS — G47.00 INSOMNIA, UNSPECIFIED TYPE: ICD-10-CM

## 2023-11-27 RX ORDER — ALPRAZOLAM 0.5 MG/1
0.5 TABLET ORAL 2 TIMES DAILY PRN
Qty: 60 TABLET | Refills: 0 | Status: SHIPPED | OUTPATIENT
Start: 2023-11-27 | End: 2023-12-27

## 2023-11-27 NOTE — TELEPHONE ENCOUNTER
From: Joe Salazar  To: Deon Hoit  Sent: 11/27/2023 5:09 AM EST  Subject: Need Alprazolam    Hello:    I am completely out of my Alprazolam. Could you please send a refill to [facility] in [place]. I take . 5 mg 2/day. Thank you.     Claudia Car

## 2023-12-14 ENCOUNTER — PATIENT MESSAGE (OUTPATIENT)
Dept: PRIMARY CARE CLINIC | Age: 48
End: 2023-12-14

## 2023-12-14 DIAGNOSIS — F90.9 ATTENTION DEFICIT HYPERACTIVITY DISORDER (ADHD), UNSPECIFIED ADHD TYPE: ICD-10-CM

## 2023-12-14 RX ORDER — METHYLPHENIDATE HYDROCHLORIDE 36 MG/1
72 TABLET, EXTENDED RELEASE ORAL DAILY
Qty: 60 TABLET | Refills: 0 | Status: SHIPPED | OUTPATIENT
Start: 2023-12-14 | End: 2023-12-14 | Stop reason: SDUPTHER

## 2023-12-14 RX ORDER — TRAZODONE HYDROCHLORIDE 100 MG/1
TABLET ORAL
Qty: 60 TABLET | Refills: 0 | Status: SHIPPED | OUTPATIENT
Start: 2023-12-14

## 2023-12-14 RX ORDER — METHYLPHENIDATE HYDROCHLORIDE 36 MG/1
72 TABLET, EXTENDED RELEASE ORAL DAILY
Qty: 60 TABLET | Refills: 0 | Status: SHIPPED | OUTPATIENT
Start: 2023-12-14 | End: 2024-01-13

## 2024-01-15 DIAGNOSIS — F90.9 ATTENTION DEFICIT HYPERACTIVITY DISORDER (ADHD), UNSPECIFIED ADHD TYPE: ICD-10-CM

## 2024-01-15 RX ORDER — TRAZODONE HYDROCHLORIDE 100 MG/1
TABLET ORAL
Qty: 60 TABLET | Refills: 0 | Status: SHIPPED | OUTPATIENT
Start: 2024-01-15

## 2024-01-15 RX ORDER — METHYLPHENIDATE HYDROCHLORIDE 36 MG/1
72 TABLET, EXTENDED RELEASE ORAL DAILY
Qty: 60 TABLET | Refills: 0 | Status: SHIPPED | OUTPATIENT
Start: 2024-01-15 | End: 2024-02-14

## 2024-01-23 ENCOUNTER — PATIENT MESSAGE (OUTPATIENT)
Dept: PRIMARY CARE CLINIC | Age: 49
End: 2024-01-23

## 2024-01-23 DIAGNOSIS — G47.00 INSOMNIA, UNSPECIFIED TYPE: ICD-10-CM

## 2024-01-23 RX ORDER — ALPRAZOLAM 0.5 MG/1
0.5 TABLET ORAL 2 TIMES DAILY PRN
Qty: 60 TABLET | Refills: 0 | Status: SHIPPED | OUTPATIENT
Start: 2024-01-23 | End: 2024-02-22

## 2024-01-23 NOTE — TELEPHONE ENCOUNTER
From: Lucien Nielsen  To: Kayce Leroy  Sent: 1/23/2024 2:57 PM EST  Subject: Xanax Refill    Hello. I am just about out of my Xanax (I have 2 or 3 days left). I tried to refill it in the [facility] here but don’t see it listed. Could you please send it to [facility] pharmacy? Thank you. Lucien

## 2024-02-14 DIAGNOSIS — F90.9 ATTENTION DEFICIT HYPERACTIVITY DISORDER (ADHD), UNSPECIFIED ADHD TYPE: ICD-10-CM

## 2024-02-14 RX ORDER — METHYLPHENIDATE HYDROCHLORIDE 36 MG/1
72 TABLET, EXTENDED RELEASE ORAL DAILY
Qty: 60 TABLET | Refills: 0 | Status: SHIPPED | OUTPATIENT
Start: 2024-02-14 | End: 2024-03-15

## 2024-02-14 RX ORDER — TRAZODONE HYDROCHLORIDE 100 MG/1
TABLET ORAL
Qty: 60 TABLET | Refills: 0 | Status: SHIPPED | OUTPATIENT
Start: 2024-02-14

## 2024-02-21 DIAGNOSIS — I10 ESSENTIAL HYPERTENSION: ICD-10-CM

## 2024-02-21 DIAGNOSIS — G47.00 INSOMNIA, UNSPECIFIED TYPE: ICD-10-CM

## 2024-02-21 RX ORDER — ALPRAZOLAM 0.5 MG/1
0.5 TABLET ORAL 2 TIMES DAILY PRN
Qty: 60 TABLET | Refills: 0 | Status: SHIPPED | OUTPATIENT
Start: 2024-02-21 | End: 2024-03-22

## 2024-02-21 RX ORDER — AMLODIPINE BESYLATE 5 MG/1
TABLET ORAL
Qty: 90 TABLET | Refills: 1 | Status: SHIPPED | OUTPATIENT
Start: 2024-02-21

## 2024-03-08 ENCOUNTER — PATIENT MESSAGE (OUTPATIENT)
Dept: PRIMARY CARE CLINIC | Age: 49
End: 2024-03-08

## 2024-03-08 DIAGNOSIS — F25.1 SCHIZOAFFECTIVE DISORDER, DEPRESSIVE TYPE (HCC): ICD-10-CM

## 2024-03-08 RX ORDER — ARIPIPRAZOLE 15 MG/1
15 TABLET ORAL DAILY
Qty: 90 TABLET | Refills: 1 | Status: SHIPPED | OUTPATIENT
Start: 2024-03-08 | End: 2024-09-04

## 2024-03-08 NOTE — TELEPHONE ENCOUNTER
From: Lucien Nielsen  To: Kayce Leroy  Sent: 3/8/2024 8:09 AM EST  Subject: Need Refill    Hello,    I am out of Abilify. I would like to start using [facility] so that I can receive my prescriptions via mail. Is that okay? If it is, here is the information to send the electronic prescription:    [facility]  [address], [address], [place] [address]  Phone: [phone]  Fax: [phone]    Thank You,    Lucien

## 2024-03-13 ENCOUNTER — PATIENT MESSAGE (OUTPATIENT)
Dept: PRIMARY CARE CLINIC | Age: 49
End: 2024-03-13

## 2024-03-13 DIAGNOSIS — F25.1 SCHIZOAFFECTIVE DISORDER, DEPRESSIVE TYPE (HCC): ICD-10-CM

## 2024-03-13 RX ORDER — TRAZODONE HYDROCHLORIDE 100 MG/1
TABLET ORAL
Qty: 60 TABLET | Refills: 0 | Status: SHIPPED | OUTPATIENT
Start: 2024-03-13

## 2024-03-13 RX ORDER — FLUOXETINE HYDROCHLORIDE 20 MG/1
40 CAPSULE ORAL DAILY
Qty: 180 CAPSULE | Refills: 1 | Status: SHIPPED | OUTPATIENT
Start: 2024-03-13 | End: 2024-09-09

## 2024-03-13 NOTE — TELEPHONE ENCOUNTER
From: Lucien Nielsen  To: Kayce Leroy  Sent: 3/13/2024 1:47 PM EDT  Subject: Trazodone/Prozac Refill    Hello,    I am nearly out of Trazodone and Prozac. Could you please send a prescription to the [facility]?    [facility]  [address], [address], [place] [address]  Phone: [phone]  Fax: [phone]    Thank you    Lucien

## 2024-03-18 DIAGNOSIS — F90.9 ATTENTION DEFICIT HYPERACTIVITY DISORDER (ADHD), UNSPECIFIED ADHD TYPE: ICD-10-CM

## 2024-03-18 RX ORDER — METHYLPHENIDATE HYDROCHLORIDE 36 MG/1
72 TABLET, EXTENDED RELEASE ORAL DAILY
Qty: 60 TABLET | Refills: 0 | Status: SHIPPED | OUTPATIENT
Start: 2024-03-18 | End: 2024-04-17

## 2024-03-25 ENCOUNTER — PATIENT MESSAGE (OUTPATIENT)
Dept: PRIMARY CARE CLINIC | Age: 49
End: 2024-03-25

## 2024-03-25 DIAGNOSIS — G47.00 INSOMNIA, UNSPECIFIED TYPE: ICD-10-CM

## 2024-03-25 RX ORDER — ALPRAZOLAM 0.5 MG/1
0.5 TABLET ORAL 2 TIMES DAILY PRN
Qty: 60 TABLET | Refills: 0 | Status: SHIPPED | OUTPATIENT
Start: 2024-03-25 | End: 2024-04-24

## 2024-03-25 NOTE — TELEPHONE ENCOUNTER
From: Lucien Nielsen  To: Kayce Leroy  Sent: 3/25/2024 2:01 PM EDT  Subject: Out of Xanax    Hello,    I have one dose left of Xanax. Could you please send in a refill to [facility] in [place].    Thank You    Lucien

## 2024-04-06 RX ORDER — TRAZODONE HYDROCHLORIDE 100 MG/1
TABLET ORAL
Qty: 60 TABLET | Refills: 0 | Status: SHIPPED | OUTPATIENT
Start: 2024-04-06

## 2024-04-16 ENCOUNTER — PATIENT MESSAGE (OUTPATIENT)
Dept: PRIMARY CARE CLINIC | Age: 49
End: 2024-04-16

## 2024-04-16 DIAGNOSIS — F90.9 ATTENTION DEFICIT HYPERACTIVITY DISORDER (ADHD), UNSPECIFIED ADHD TYPE: ICD-10-CM

## 2024-04-16 RX ORDER — METHYLPHENIDATE HYDROCHLORIDE 36 MG/1
72 TABLET, EXTENDED RELEASE ORAL DAILY
Qty: 60 TABLET | Refills: 0 | Status: SHIPPED | OUTPATIENT
Start: 2024-04-16 | End: 2024-05-16

## 2024-04-16 NOTE — TELEPHONE ENCOUNTER
From: Lucien Nielsen  To: Kayce Leroy  Sent: 4/16/2024 5:31 AM EDT  Subject: Concerta Refill    Hello,    I have two days of Concerta left. Could you please send a prescription to [facility].    Thankyou    Lucien

## 2024-04-22 DIAGNOSIS — G47.00 INSOMNIA, UNSPECIFIED TYPE: ICD-10-CM

## 2024-04-22 RX ORDER — ALPRAZOLAM 0.5 MG/1
0.5 TABLET ORAL 2 TIMES DAILY PRN
Qty: 60 TABLET | Refills: 0 | Status: SHIPPED | OUTPATIENT
Start: 2024-04-22 | End: 2024-05-22

## 2024-05-01 RX ORDER — TRAZODONE HYDROCHLORIDE 100 MG/1
TABLET ORAL
Qty: 60 TABLET | Refills: 0 | Status: SHIPPED | OUTPATIENT
Start: 2024-05-01

## 2024-05-05 ENCOUNTER — PATIENT MESSAGE (OUTPATIENT)
Dept: PRIMARY CARE CLINIC | Age: 49
End: 2024-05-05

## 2024-05-06 RX ORDER — FENOFIBRATE 145 MG/1
145 TABLET, COATED ORAL DAILY
Qty: 90 TABLET | Refills: 1 | Status: SHIPPED | OUTPATIENT
Start: 2024-05-06

## 2024-05-06 NOTE — TELEPHONE ENCOUNTER
From: Lucien Nielsen  To: Kayce Leroy  Sent: 5/5/2024 7:42 AM EDT  Subject: Fenofibrate Refill    Hello:    I am out of fenofibrate. Could you please send the prescription to [facility].     Thank you    Lucien

## 2024-05-16 ENCOUNTER — PATIENT MESSAGE (OUTPATIENT)
Dept: PRIMARY CARE CLINIC | Age: 49
End: 2024-05-16

## 2024-05-16 DIAGNOSIS — F90.9 ATTENTION DEFICIT HYPERACTIVITY DISORDER (ADHD), UNSPECIFIED ADHD TYPE: ICD-10-CM

## 2024-05-16 RX ORDER — METHYLPHENIDATE HYDROCHLORIDE 36 MG/1
72 TABLET, EXTENDED RELEASE ORAL DAILY
Qty: 60 TABLET | Refills: 0 | Status: SHIPPED | OUTPATIENT
Start: 2024-05-16 | End: 2024-06-15

## 2024-05-16 NOTE — TELEPHONE ENCOUNTER
From: Lucien Nielsen  To: Kayce Leroy  Sent: 5/16/2024 7:46 AM EDT  Subject: Concerta Refill    Hello,    I have two days left of Concerta. Could you please send the prescription to [facility] in [place].     Thank You    Lucien

## 2024-05-20 ENCOUNTER — PATIENT MESSAGE (OUTPATIENT)
Dept: PRIMARY CARE CLINIC | Age: 49
End: 2024-05-20

## 2024-05-20 DIAGNOSIS — K21.9 GASTROESOPHAGEAL REFLUX DISEASE WITHOUT ESOPHAGITIS: ICD-10-CM

## 2024-05-20 RX ORDER — OMEPRAZOLE 20 MG/1
CAPSULE, DELAYED RELEASE ORAL
Qty: 180 CAPSULE | Refills: 1 | Status: SHIPPED | OUTPATIENT
Start: 2024-05-20

## 2024-05-20 NOTE — TELEPHONE ENCOUNTER
From: Lucien Nielsen  To: Kayce Leroy  Sent: 5/20/2024 7:30 AM EDT  Subject: Omeprazole Refill    Hello,    I am out of Omeprazole. Could you please send a prescription to [facility].     Thank You    Lucien

## 2024-05-20 NOTE — TELEPHONE ENCOUNTER
Last Visit Date: 12/19/2023   Next Visit Date: 6/13/2024   Pharmacy:       [facility] - [place], [place] - [address] - P [phone] - F [phone]  [address] [address]  Phone: [phone] Fax: [phone]

## 2024-05-22 DIAGNOSIS — G47.00 INSOMNIA, UNSPECIFIED TYPE: ICD-10-CM

## 2024-05-22 RX ORDER — ALPRAZOLAM 0.5 MG/1
0.5 TABLET ORAL 2 TIMES DAILY PRN
Qty: 60 TABLET | Refills: 0 | Status: SHIPPED | OUTPATIENT
Start: 2024-05-22 | End: 2024-06-21

## 2024-05-28 ENCOUNTER — PATIENT MESSAGE (OUTPATIENT)
Dept: PRIMARY CARE CLINIC | Age: 49
End: 2024-05-28

## 2024-05-28 DIAGNOSIS — I10 ESSENTIAL HYPERTENSION: ICD-10-CM

## 2024-05-28 RX ORDER — AMLODIPINE BESYLATE 5 MG/1
TABLET ORAL
Qty: 90 TABLET | Refills: 1 | Status: SHIPPED | OUTPATIENT
Start: 2024-05-28

## 2024-05-28 NOTE — TELEPHONE ENCOUNTER
Have been unable to reach pt multiple times to discuss INR.  Left message to return call.      Last Visit Date: 12/19/2023   Next Visit Date: 6/13/2024   Pharmacy: [facility]   Chonodrocutaneous Helical Advancement Flap Text: The defect edges were debeveled with a #15 scalpel blade.  Given the location of the defect and the proximity to free margins a chondrocutaneous helical advancement flap was deemed most appropriate.  Using a sterile surgical marker, the appropriate advancement flap was drawn incorporating the defect and placing the expected incisions within the relaxed skin tension lines where possible.    The area thus outlined was incised deep to adipose tissue with a #15 scalpel blade.  The skin margins were undermined to an appropriate distance in all directions utilizing iris scissors.

## 2024-05-28 NOTE — TELEPHONE ENCOUNTER
From: Lucien Nielsen  To: Kayce Leroy  Sent: 5/28/2024 6:49 AM EDT  Subject: Amlodipine Refill    Hello,    I am getting close to being out of Amlodipine. Could you please send a refill to [facility].     Thank You    Lucien

## 2024-05-29 DIAGNOSIS — F25.1 SCHIZOAFFECTIVE DISORDER, DEPRESSIVE TYPE (HCC): ICD-10-CM

## 2024-05-29 RX ORDER — FLUOXETINE HYDROCHLORIDE 20 MG/1
40 CAPSULE ORAL DAILY
Qty: 180 CAPSULE | Refills: 0 | Status: SHIPPED | OUTPATIENT
Start: 2024-05-29

## 2024-05-29 RX ORDER — ARIPIPRAZOLE 15 MG/1
15 TABLET ORAL DAILY
Qty: 90 TABLET | Refills: 0 | Status: SHIPPED | OUTPATIENT
Start: 2024-05-29 | End: 2024-11-25

## 2024-05-30 RX ORDER — TRAZODONE HYDROCHLORIDE 100 MG/1
TABLET ORAL
Qty: 60 TABLET | Refills: 5 | Status: SHIPPED | OUTPATIENT
Start: 2024-05-30

## 2024-06-10 SDOH — ECONOMIC STABILITY: INCOME INSECURITY: HOW HARD IS IT FOR YOU TO PAY FOR THE VERY BASICS LIKE FOOD, HOUSING, MEDICAL CARE, AND HEATING?: NOT VERY HARD

## 2024-06-10 SDOH — ECONOMIC STABILITY: FOOD INSECURITY: WITHIN THE PAST 12 MONTHS, THE FOOD YOU BOUGHT JUST DIDN'T LAST AND YOU DIDN'T HAVE MONEY TO GET MORE.: NEVER TRUE

## 2024-06-10 SDOH — ECONOMIC STABILITY: FOOD INSECURITY: WITHIN THE PAST 12 MONTHS, YOU WORRIED THAT YOUR FOOD WOULD RUN OUT BEFORE YOU GOT MONEY TO BUY MORE.: NEVER TRUE

## 2024-06-10 ASSESSMENT — PATIENT HEALTH QUESTIONNAIRE - PHQ9
SUM OF ALL RESPONSES TO PHQ QUESTIONS 1-9: 17
6. FEELING BAD ABOUT YOURSELF - OR THAT YOU ARE A FAILURE OR HAVE LET YOURSELF OR YOUR FAMILY DOWN: MORE THAN HALF THE DAYS
7. TROUBLE CONCENTRATING ON THINGS, SUCH AS READING THE NEWSPAPER OR WATCHING TELEVISION: MORE THAN HALF THE DAYS
SUM OF ALL RESPONSES TO PHQ QUESTIONS 1-9: 17
9. THOUGHTS THAT YOU WOULD BE BETTER OFF DEAD, OR OF HURTING YOURSELF: NOT AT ALL
6. FEELING BAD ABOUT YOURSELF - OR THAT YOU ARE A FAILURE OR HAVE LET YOURSELF OR YOUR FAMILY DOWN: MORE THAN HALF THE DAYS
5. POOR APPETITE OR OVEREATING: MORE THAN HALF THE DAYS
8. MOVING OR SPEAKING SO SLOWLY THAT OTHER PEOPLE COULD HAVE NOTICED. OR THE OPPOSITE - BEING SO FIDGETY OR RESTLESS THAT YOU HAVE BEEN MOVING AROUND A LOT MORE THAN USUAL: MORE THAN HALF THE DAYS
SUM OF ALL RESPONSES TO PHQ QUESTIONS 1-9: 17
4. FEELING TIRED OR HAVING LITTLE ENERGY: NEARLY EVERY DAY
3. TROUBLE FALLING OR STAYING ASLEEP: MORE THAN HALF THE DAYS
10. IF YOU CHECKED OFF ANY PROBLEMS, HOW DIFFICULT HAVE THESE PROBLEMS MADE IT FOR YOU TO DO YOUR WORK, TAKE CARE OF THINGS AT HOME, OR GET ALONG WITH OTHER PEOPLE: VERY DIFFICULT
SUM OF ALL RESPONSES TO PHQ QUESTIONS 1-9: 17
1. LITTLE INTEREST OR PLEASURE IN DOING THINGS: SEVERAL DAYS
5. POOR APPETITE OR OVEREATING: MORE THAN HALF THE DAYS
9. THOUGHTS THAT YOU WOULD BE BETTER OFF DEAD, OR OF HURTING YOURSELF: NOT AT ALL
4. FEELING TIRED OR HAVING LITTLE ENERGY: NEARLY EVERY DAY
3. TROUBLE FALLING OR STAYING ASLEEP: MORE THAN HALF THE DAYS
2. FEELING DOWN, DEPRESSED OR HOPELESS: NEARLY EVERY DAY
10. IF YOU CHECKED OFF ANY PROBLEMS, HOW DIFFICULT HAVE THESE PROBLEMS MADE IT FOR YOU TO DO YOUR WORK, TAKE CARE OF THINGS AT HOME, OR GET ALONG WITH OTHER PEOPLE: VERY DIFFICULT
8. MOVING OR SPEAKING SO SLOWLY THAT OTHER PEOPLE COULD HAVE NOTICED. OR THE OPPOSITE, BEING SO FIGETY OR RESTLESS THAT YOU HAVE BEEN MOVING AROUND A LOT MORE THAN USUAL: MORE THAN HALF THE DAYS
2. FEELING DOWN, DEPRESSED OR HOPELESS: NEARLY EVERY DAY
1. LITTLE INTEREST OR PLEASURE IN DOING THINGS: SEVERAL DAYS
SUM OF ALL RESPONSES TO PHQ9 QUESTIONS 1 & 2: 4
7. TROUBLE CONCENTRATING ON THINGS, SUCH AS READING THE NEWSPAPER OR WATCHING TELEVISION: MORE THAN HALF THE DAYS
SUM OF ALL RESPONSES TO PHQ QUESTIONS 1-9: 17

## 2024-06-11 ENCOUNTER — HOSPITAL ENCOUNTER (OUTPATIENT)
Age: 49
Discharge: HOME | End: 2024-06-11
Payer: MEDICAID

## 2024-06-11 DIAGNOSIS — E78.1 HYPERTRIGLYCERIDEMIA: ICD-10-CM

## 2024-06-11 LAB
ALBUMIN SERPL-MCNC: 5 G/DL (ref 3.5–5.2)
ALBUMIN/GLOB SERPL: 2 {RATIO} (ref 1–2.5)
ALP SERPL-CCNC: 42 U/L (ref 40–129)
ALT SERPL-CCNC: 23 U/L (ref 10–50)
ANION GAP SERPL CALCULATED.3IONS-SCNC: 12 MMOL/L (ref 9–16)
AST SERPL-CCNC: 28 U/L (ref 10–50)
BILIRUB SERPL-MCNC: 0.4 MG/DL (ref 0–1.2)
BUN SERPL-MCNC: 16 MG/DL (ref 6–20)
CALCIUM SERPL-MCNC: 9.9 MG/DL (ref 8.6–10.4)
CHLORIDE SERPL-SCNC: 104 MMOL/L (ref 98–107)
CHOLEST SERPL-MCNC: 173 MG/DL (ref 0–199)
CHOLESTEROL/HDL RATIO: 5
CO2 SERPL-SCNC: 27 MMOL/L (ref 20–31)
CREAT SERPL-MCNC: 1.4 MG/DL (ref 0.7–1.2)
ERYTHROCYTE [DISTWIDTH] IN BLOOD BY AUTOMATED COUNT: 13.1 % (ref 11.8–14.4)
GFR, ESTIMATED: 61 ML/MIN/1.73M2
GLUCOSE SERPL-MCNC: 97 MG/DL (ref 74–99)
HCT VFR BLD AUTO: 44.9 % (ref 40.7–50.3)
HDLC SERPL-MCNC: 33 MG/DL
HGB BLD-MCNC: 14.9 G/DL (ref 13–17)
LDLC SERPL CALC-MCNC: 101 MG/DL (ref 0–100)
MCH RBC QN AUTO: 28.4 PG (ref 25.2–33.5)
MCHC RBC AUTO-ENTMCNC: 33.2 G/DL (ref 28.4–34.8)
MCV RBC AUTO: 85.5 FL (ref 82.6–102.9)
NRBC BLD-RTO: 0 PER 100 WBC
PLATELET # BLD AUTO: 398 K/UL (ref 138–453)
PMV BLD AUTO: 9.8 FL (ref 8.1–13.5)
POTASSIUM SERPL-SCNC: 4.9 MMOL/L (ref 3.7–5.3)
PROT SERPL-MCNC: 7.3 G/DL (ref 6.6–8.7)
RBC # BLD AUTO: 5.25 M/UL (ref 4.21–5.77)
SODIUM SERPL-SCNC: 143 MMOL/L (ref 136–145)
TRIGL SERPL-MCNC: 196 MG/DL
VLDLC SERPL CALC-MCNC: 39 MG/DL
WBC OTHER # BLD: 7.7 K/UL (ref 3.5–11.3)

## 2024-06-11 PROCEDURE — 80061 LIPID PANEL: CPT

## 2024-06-11 PROCEDURE — 36415 COLL VENOUS BLD VENIPUNCTURE: CPT

## 2024-06-11 PROCEDURE — 80053 COMPREHEN METABOLIC PANEL: CPT

## 2024-06-11 PROCEDURE — 85027 COMPLETE CBC AUTOMATED: CPT

## 2024-06-13 ENCOUNTER — OFFICE VISIT (OUTPATIENT)
Dept: PRIMARY CARE CLINIC | Age: 49
End: 2024-06-13
Payer: MEDICAID

## 2024-06-13 VITALS
HEART RATE: 93 BPM | SYSTOLIC BLOOD PRESSURE: 90 MMHG | WEIGHT: 268.4 LBS | DIASTOLIC BLOOD PRESSURE: 62 MMHG | OXYGEN SATURATION: 97 % | RESPIRATION RATE: 18 BRPM | BODY MASS INDEX: 33.55 KG/M2

## 2024-06-13 DIAGNOSIS — M54.50 ACUTE MIDLINE LOW BACK PAIN WITHOUT SCIATICA: ICD-10-CM

## 2024-06-13 DIAGNOSIS — E78.1 HYPERTRIGLYCERIDEMIA: ICD-10-CM

## 2024-06-13 DIAGNOSIS — F25.1 SCHIZOAFFECTIVE DISORDER, DEPRESSIVE TYPE (HCC): ICD-10-CM

## 2024-06-13 DIAGNOSIS — G47.00 INSOMNIA, UNSPECIFIED TYPE: ICD-10-CM

## 2024-06-13 DIAGNOSIS — F90.9 ATTENTION DEFICIT HYPERACTIVITY DISORDER (ADHD), UNSPECIFIED ADHD TYPE: ICD-10-CM

## 2024-06-13 DIAGNOSIS — I10 ESSENTIAL HYPERTENSION: Primary | ICD-10-CM

## 2024-06-13 PROCEDURE — 99215 OFFICE O/P EST HI 40 MIN: CPT | Performed by: NURSE PRACTITIONER

## 2024-06-13 PROCEDURE — 3074F SYST BP LT 130 MM HG: CPT | Performed by: NURSE PRACTITIONER

## 2024-06-13 PROCEDURE — 3078F DIAST BP <80 MM HG: CPT | Performed by: NURSE PRACTITIONER

## 2024-06-13 PROCEDURE — 1036F TOBACCO NON-USER: CPT | Performed by: NURSE PRACTITIONER

## 2024-06-13 PROCEDURE — G8427 DOCREV CUR MEDS BY ELIG CLIN: HCPCS | Performed by: NURSE PRACTITIONER

## 2024-06-13 PROCEDURE — G8417 CALC BMI ABV UP PARAM F/U: HCPCS | Performed by: NURSE PRACTITIONER

## 2024-06-13 RX ORDER — CYCLOBENZAPRINE HCL 10 MG
10 TABLET ORAL 3 TIMES DAILY PRN
Qty: 21 TABLET | Refills: 4 | Status: SHIPPED | OUTPATIENT
Start: 2024-06-13 | End: 2024-06-23

## 2024-06-13 RX ORDER — METHYLPHENIDATE HYDROCHLORIDE 36 MG/1
72 TABLET, EXTENDED RELEASE ORAL DAILY
Qty: 60 TABLET | Refills: 0 | Status: SHIPPED | OUTPATIENT
Start: 2024-06-13 | End: 2024-07-13

## 2024-06-13 ASSESSMENT — ENCOUNTER SYMPTOMS
SORE THROAT: 0
COUGH: 0
SHORTNESS OF BREATH: 0
ABDOMINAL PAIN: 0
CHEST TIGHTNESS: 0
EYE REDNESS: 0
NAUSEA: 0
DIARRHEA: 0
SINUS PAIN: 0
EYE DISCHARGE: 0
SINUS PRESSURE: 0
EYE ITCHING: 0
VOMITING: 0
TROUBLE SWALLOWING: 0
WHEEZING: 0

## 2024-06-13 NOTE — PROGRESS NOTES
[facility]  [facility]  [address] [place] [address] [address]  [address]  [facility] [address]  Dept: [phone]  Dept Fax: [phone]    Lucien Nielsen is a 48 y.o. male who presents today for his medical conditions/complaintsas noted below.  Lucien Nielsen is c/o of [employer]        HPI:     Patient presents for a 3-month medication follow-up.    Blood pressure stable  Weight is up 5 pounds    Patient presents for 3-month medication follow-up.  He continues to do well on the Concerta.  He is trying to exercise.  He did go to the gym this morning.  He does admit that he tends to emotionally eat when he battles with his depression.  No thoughts of harming himself or others.  He does not follow with a counselor anymore.  He no longer follows with a psychiatrist.  All of his medications are managed by primary care.  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  Today's blood pressure is the lowest that I have seen.  He denies dizziness or lightheadedness.        Past Medical History:   Diagnosis Date    Acid reflux     ADHD (attention deficit hyperactivity disorder)     Anxiety     Depression     Insomnia       Past Surgical History:   Procedure Laterality Date    APPENDECTOMY      CHOLECYSTECTOMY         Family History   Problem Relation Age of Onset    Breast Cancer Mother     Cancer Father     Heart Disease Father        Social History     Tobacco Use    Smoking status: Never    Smokeless tobacco: Never   Substance Use Topics    Alcohol use: Not Currently      Current Outpatient Medications   Medication Sig Dispense Refill    CONCERTA 36 MG extended release tablet Take 2 tablets by mouth daily for 30 days. Max Daily Amount: 72 mg 60 tablet 0    cyclobenzaprine (FLEXERIL) 10 MG tablet Take 1 tablet by mouth 3 times daily as needed for Muscle spasms 21 tablet 4    traZODone (DESYREL) 100

## 2024-06-20 DIAGNOSIS — G47.00 INSOMNIA, UNSPECIFIED TYPE: ICD-10-CM

## 2024-06-20 RX ORDER — ALPRAZOLAM 0.5 MG/1
0.5 TABLET ORAL 2 TIMES DAILY PRN
Qty: 60 TABLET | Refills: 0 | Status: SHIPPED | OUTPATIENT
Start: 2024-06-20 | End: 2024-07-20

## 2024-07-03 ENCOUNTER — PATIENT MESSAGE (OUTPATIENT)
Dept: PRIMARY CARE CLINIC | Age: 49
End: 2024-07-03

## 2024-07-03 DIAGNOSIS — F25.1 SCHIZOAFFECTIVE DISORDER, DEPRESSIVE TYPE (HCC): Primary | ICD-10-CM

## 2024-07-03 DIAGNOSIS — G47.00 INSOMNIA, UNSPECIFIED TYPE: ICD-10-CM
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
## 2024-07-15 DIAGNOSIS — F90.9 ATTENTION DEFICIT HYPERACTIVITY DISORDER (ADHD), UNSPECIFIED ADHD TYPE: ICD-10-CM

## 2024-07-15 RX ORDER — METHYLPHENIDATE HYDROCHLORIDE 36 MG/1
72 TABLET, EXTENDED RELEASE ORAL DAILY
Qty: 60 TABLET | Refills: 0 | Status: SHIPPED | OUTPATIENT
Start: 2024-07-15 | End: 2024-08-14

## 2024-07-22 ENCOUNTER — PATIENT MESSAGE (OUTPATIENT)
Dept: PRIMARY CARE CLINIC | Age: 49
End: 2024-07-22

## 2024-07-22 DIAGNOSIS — G47.00 INSOMNIA, UNSPECIFIED TYPE: ICD-10-CM

## 2024-07-22 RX ORDER — ALPRAZOLAM 0.5 MG/1
0.5 TABLET ORAL 2 TIMES DAILY PRN
Qty: 60 TABLET | Refills: 0 | Status: SHIPPED | OUTPATIENT
Start: 2024-07-22 | End: 2024-08-21

## 2024-07-22 NOTE — TELEPHONE ENCOUNTER
From: Lucien Nielsen  To: Kayce Leroy  Sent: 7/22/2024 8:33 AM EDT  Subject: Xanax    Hello:    I have two doses left of my Xanax. I don’t see it in my list of medications on here to refill. Could you please send the prescription to [facility] in [place]? Thank you.    Lucien Nielsen

## 2024-08-13 DIAGNOSIS — F90.9 ATTENTION DEFICIT HYPERACTIVITY DISORDER (ADHD), UNSPECIFIED ADHD TYPE: ICD-10-CM

## 2024-08-13 RX ORDER — METHYLPHENIDATE HYDROCHLORIDE 36 MG/1
72 TABLET, EXTENDED RELEASE ORAL DAILY
Qty: 60 TABLET | Refills: 0 | Status: SHIPPED | OUTPATIENT
Start: 2024-08-13 | End: 2024-09-12

## 2024-08-20 DIAGNOSIS — G47.00 INSOMNIA, UNSPECIFIED TYPE: ICD-10-CM

## 2024-08-20 RX ORDER — ALPRAZOLAM 0.5 MG/1
0.5 TABLET ORAL 2 TIMES DAILY PRN
Qty: 60 TABLET | Refills: 0 | Status: SHIPPED | OUTPATIENT
Start: 2024-08-20 | End: 2024-09-19

## 2024-09-12 DIAGNOSIS — F90.9 ATTENTION DEFICIT HYPERACTIVITY DISORDER (ADHD), UNSPECIFIED ADHD TYPE: ICD-10-CM

## 2024-09-12 RX ORDER — METHYLPHENIDATE HYDROCHLORIDE 36 MG/1
72 TABLET, EXTENDED RELEASE ORAL DAILY
Qty: 60 TABLET | Refills: 0 | Status: SHIPPED | OUTPATIENT
Start: 2024-09-12 | End: 2024-10-12

## 2024-09-16 ENCOUNTER — OFFICE VISIT (OUTPATIENT)
Dept: PRIMARY CARE CLINIC | Age: 49
End: 2024-09-16
Payer: MEDICAID

## 2024-09-16 VITALS
BODY MASS INDEX: 33 KG/M2 | DIASTOLIC BLOOD PRESSURE: 84 MMHG | HEART RATE: 87 BPM | RESPIRATION RATE: 14 BRPM | WEIGHT: 264 LBS | OXYGEN SATURATION: 97 % | SYSTOLIC BLOOD PRESSURE: 126 MMHG

## 2024-09-16 DIAGNOSIS — G47.00 INSOMNIA, UNSPECIFIED TYPE: ICD-10-CM

## 2024-09-16 DIAGNOSIS — F25.1 SCHIZOAFFECTIVE DISORDER, DEPRESSIVE TYPE (HCC): ICD-10-CM

## 2024-09-16 DIAGNOSIS — R55 VASOVAGAL SYNCOPE: Primary | ICD-10-CM

## 2024-09-16 DIAGNOSIS — R53.83 OTHER FATIGUE: ICD-10-CM

## 2024-09-16 PROCEDURE — 1036F TOBACCO NON-USER: CPT | Performed by: NURSE PRACTITIONER

## 2024-09-16 PROCEDURE — 3079F DIAST BP 80-89 MM HG: CPT | Performed by: NURSE PRACTITIONER

## 2024-09-16 PROCEDURE — G8427 DOCREV CUR MEDS BY ELIG CLIN: HCPCS | Performed by: NURSE PRACTITIONER

## 2024-09-16 PROCEDURE — 99214 OFFICE O/P EST MOD 30 MIN: CPT | Performed by: NURSE PRACTITIONER

## 2024-09-16 PROCEDURE — G8417 CALC BMI ABV UP PARAM F/U: HCPCS | Performed by: NURSE PRACTITIONER

## 2024-09-16 PROCEDURE — 3074F SYST BP LT 130 MM HG: CPT | Performed by: NURSE PRACTITIONER

## 2024-09-16 RX ORDER — ALPRAZOLAM 0.5 MG
0.5 TABLET ORAL 2 TIMES DAILY PRN
Qty: 60 TABLET | Refills: 0 | Status: SHIPPED | OUTPATIENT
Start: 2024-09-16 | End: 2024-10-16

## 2024-09-16 ASSESSMENT — ENCOUNTER SYMPTOMS
SINUS PRESSURE: 0
EYE REDNESS: 0
WHEEZING: 0
SINUS PAIN: 0
COUGH: 0
TROUBLE SWALLOWING: 0
CHEST TIGHTNESS: 0
EYE DISCHARGE: 0
DIARRHEA: 0
SHORTNESS OF BREATH: 0
EYE ITCHING: 0
NAUSEA: 0
SORE THROAT: 0
VOMITING: 0
ABDOMINAL PAIN: 0

## 2024-09-16 ASSESSMENT — PATIENT HEALTH QUESTIONNAIRE - PHQ9: DEPRESSION UNABLE TO ASSESS: PT REFUSES

## 2024-09-27 ENCOUNTER — HOSPITAL ENCOUNTER (OUTPATIENT)
Age: 49
Discharge: HOME | End: 2024-09-29
Payer: MEDICAID

## 2024-09-27 VITALS — HEIGHT: 75 IN | BODY MASS INDEX: 32.83 KG/M2 | WEIGHT: 264 LBS

## 2024-09-27 DIAGNOSIS — R55 VASOVAGAL SYNCOPE: ICD-10-CM

## 2024-09-27 LAB
ECHO AO ROOT DIAM: 3.5 CM
ECHO AO ROOT INDEX: 1.42 CM/M2
ECHO AV AREA PEAK VELOCITY: 4.1 CM2
ECHO AV AREA VTI: 4.3 CM2
ECHO AV AREA/BSA PEAK VELOCITY: 1.7 CM2/M2
ECHO AV AREA/BSA VTI: 1.7 CM2/M2
ECHO AV MEAN GRADIENT: 3 MMHG
ECHO AV MEAN VELOCITY: 0.9 M/S
ECHO AV PEAK GRADIENT: 5 MMHG
ECHO AV PEAK VELOCITY: 1.1 M/S
ECHO AV VELOCITY RATIO: 0.91
ECHO AV VTI: 25 CM
ECHO BSA: 2.52 M2
ECHO IVC EXP: 1.5 CM
ECHO IVC INSP: 0.7 CM
ECHO LA AREA 2C: 16.3 CM2
ECHO LA AREA 4C: 15.6 CM2
ECHO LA DIAMETER INDEX: 1.58 CM/M2
ECHO LA DIAMETER: 3.9 CM
ECHO LA MAJOR AXIS: 5.6 CM
ECHO LA MINOR AXIS: 5.2 CM
ECHO LA TO AORTIC ROOT RATIO: 1.11
ECHO LA VOL BP: 39 ML (ref 18–58)
ECHO LA VOL MOD A2C: 43 ML (ref 18–58)
ECHO LA VOL MOD A4C: 32 ML (ref 18–58)
ECHO LA VOL/BSA BIPLANE: 16 ML/M2 (ref 16–34)
ECHO LA VOLUME INDEX MOD A2C: 17 ML/M2 (ref 16–34)
ECHO LA VOLUME INDEX MOD A4C: 13 ML/M2 (ref 16–34)
ECHO LV E' LATERAL VELOCITY: 8.4 CM/S
ECHO LV E' SEPTAL VELOCITY: 7.8 CM/S
ECHO LV EF PHYSICIAN: 60 %
ECHO LV FRACTIONAL SHORTENING: 35 % (ref 28–44)
ECHO LV INTERNAL DIMENSION DIASTOLE INDEX: 1.94 CM/M2
ECHO LV INTERNAL DIMENSION DIASTOLIC: 4.8 CM (ref 4.2–5.9)
ECHO LV INTERNAL DIMENSION SYSTOLIC INDEX: 1.26 CM/M2
ECHO LV INTERNAL DIMENSION SYSTOLIC: 3.1 CM
ECHO LV IVSD: 1 CM (ref 0.6–1)
ECHO LV MASS 2D: 170.2 G (ref 88–224)
ECHO LV MASS INDEX 2D: 68.9 G/M2 (ref 49–115)
ECHO LV POSTERIOR WALL DIASTOLIC: 1 CM (ref 0.6–1)
ECHO LV RELATIVE WALL THICKNESS RATIO: 0.42
ECHO LVOT AREA: 4.5 CM2
ECHO LVOT AV VTI INDEX: 0.96
ECHO LVOT DIAM: 2.4 CM
ECHO LVOT MEAN GRADIENT: 2 MMHG
ECHO LVOT PEAK GRADIENT: 4 MMHG
ECHO LVOT PEAK VELOCITY: 1 M/S
ECHO LVOT STROKE VOLUME INDEX: 43.8 ML/M2
ECHO LVOT SV: 108.1 ML
ECHO LVOT VTI: 23.9 CM
ECHO MV A VELOCITY: 0.5 M/S
ECHO MV E DECELERATION TIME (DT): 187 MS
ECHO MV E VELOCITY: 0.62 M/S
ECHO MV E/A RATIO: 1.24
ECHO MV E/E' LATERAL: 7.38
ECHO MV E/E' RATIO (AVERAGED): 7.66
ECHO MV E/E' SEPTAL: 7.95
ECHO RV BASAL DIMENSION: 3.4 CM
ECHO RV FREE WALL PEAK S': 11 CM/S

## 2024-09-27 PROCEDURE — 93306 TTE W/DOPPLER COMPLETE: CPT | Performed by: INTERNAL MEDICINE

## 2024-09-27 PROCEDURE — 93306 TTE W/DOPPLER COMPLETE: CPT

## 2024-09-30 RX ORDER — FENOFIBRATE 145 MG/1
145 TABLET, COATED ORAL DAILY
Qty: 90 TABLET | Refills: 0 | Status: SHIPPED | OUTPATIENT
Start: 2024-09-30

## 2024-10-11 DIAGNOSIS — F90.9 ATTENTION DEFICIT HYPERACTIVITY DISORDER (ADHD), UNSPECIFIED ADHD TYPE: ICD-10-CM

## 2024-10-11 RX ORDER — METHYLPHENIDATE HYDROCHLORIDE 36 MG/1
72 TABLET, EXTENDED RELEASE ORAL DAILY
Qty: 60 TABLET | Refills: 0 | Status: SHIPPED | OUTPATIENT
Start: 2024-10-11 | End: 2024-11-10

## 2024-10-13 DIAGNOSIS — K21.9 GASTROESOPHAGEAL REFLUX DISEASE WITHOUT ESOPHAGITIS: ICD-10-CM

## 2024-10-17 ENCOUNTER — HOSPITAL ENCOUNTER (OUTPATIENT)
Dept: MRI IMAGING | Age: 49
Discharge: HOME | End: 2024-10-19
Payer: MEDICAID

## 2024-10-17 DIAGNOSIS — R55 VASOVAGAL SYNCOPE: ICD-10-CM

## 2024-10-17 DIAGNOSIS — G47.00 INSOMNIA, UNSPECIFIED TYPE: ICD-10-CM

## 2024-10-17 DIAGNOSIS — F25.1 SCHIZOAFFECTIVE DISORDER, DEPRESSIVE TYPE (HCC): ICD-10-CM

## 2024-10-17 DIAGNOSIS — R53.83 OTHER FATIGUE: ICD-10-CM

## 2024-10-17 PROCEDURE — A9579 GAD-BASE MR CONTRAST NOS,1ML: HCPCS | Performed by: NURSE PRACTITIONER

## 2024-10-17 PROCEDURE — 6360000004 HC RX CONTRAST MEDICATION: Performed by: NURSE PRACTITIONER

## 2024-10-17 PROCEDURE — 70553 MRI BRAIN STEM W/O & W/DYE: CPT

## 2024-10-17 RX ADMIN — GADOTERIDOL 20 ML: 279.3 INJECTION, SOLUTION INTRAVENOUS at 10:55

## 2024-10-18 ENCOUNTER — PATIENT MESSAGE (OUTPATIENT)
Dept: PRIMARY CARE CLINIC | Age: 49
End: 2024-10-18

## 2024-10-18 DIAGNOSIS — G47.00 INSOMNIA, UNSPECIFIED TYPE: ICD-10-CM

## 2024-10-19 RX ORDER — ALPRAZOLAM 0.5 MG
0.5 TABLET ORAL 2 TIMES DAILY PRN
Qty: 60 TABLET | Refills: 0 | Status: SHIPPED | OUTPATIENT
Start: 2024-10-19 | End: 2024-11-18

## 2024-11-08 DIAGNOSIS — F25.1 SCHIZOAFFECTIVE DISORDER, DEPRESSIVE TYPE (HCC): ICD-10-CM

## 2024-11-09 DIAGNOSIS — F25.1 SCHIZOAFFECTIVE DISORDER, DEPRESSIVE TYPE (HCC): ICD-10-CM

## 2024-11-09 RX ORDER — ARIPIPRAZOLE 15 MG/1
15 TABLET ORAL DAILY
Qty: 90 TABLET | Refills: 0 | Status: SHIPPED | OUTPATIENT
Start: 2024-11-09 | End: 2025-05-08

## 2024-11-11 DIAGNOSIS — F90.9 ATTENTION DEFICIT HYPERACTIVITY DISORDER (ADHD), UNSPECIFIED ADHD TYPE: ICD-10-CM

## 2024-11-11 RX ORDER — METHYLPHENIDATE HYDROCHLORIDE 36 MG/1
72 TABLET, EXTENDED RELEASE ORAL DAILY
Qty: 60 TABLET | Refills: 0 | Status: SHIPPED | OUTPATIENT
Start: 2024-11-11 | End: 2024-12-11

## 2024-11-11 NOTE — TELEPHONE ENCOUNTER
Last Visit Date: 9/16/2024   Next Visit Date: Visit date not found    To get better and follow your care plan as instructed.

## 2024-11-13 RX ORDER — TRAZODONE HYDROCHLORIDE 100 MG/1
TABLET ORAL
Qty: 60 TABLET | Refills: 4 | Status: SHIPPED | OUTPATIENT
Start: 2024-11-13

## 2024-11-17 DIAGNOSIS — G47.00 INSOMNIA, UNSPECIFIED TYPE: ICD-10-CM

## 2024-11-18 RX ORDER — ALPRAZOLAM 0.5 MG
0.5 TABLET ORAL 2 TIMES DAILY PRN
Qty: 60 TABLET | Refills: 0 | Status: SHIPPED | OUTPATIENT
Start: 2024-11-18 | End: 2024-12-18

## 2024-11-29 DIAGNOSIS — F25.1 SCHIZOAFFECTIVE DISORDER, DEPRESSIVE TYPE (HCC): ICD-10-CM

## 2024-11-29 RX ORDER — ARIPIPRAZOLE 15 MG/1
15 TABLET ORAL DAILY
Qty: 90 TABLET | Refills: 0 | Status: SHIPPED | OUTPATIENT
Start: 2024-11-29 | End: 2025-05-28

## 2024-12-10 DIAGNOSIS — F90.9 ATTENTION DEFICIT HYPERACTIVITY DISORDER (ADHD), UNSPECIFIED ADHD TYPE: ICD-10-CM

## 2024-12-10 RX ORDER — METHYLPHENIDATE HYDROCHLORIDE 36 MG/1
72 TABLET, EXTENDED RELEASE ORAL DAILY
Qty: 60 TABLET | Refills: 0 | Status: SHIPPED | OUTPATIENT
Start: 2024-12-10 | End: 2025-01-09

## 2024-12-16 RX ORDER — FENOFIBRATE 145 MG/1
145 TABLET, COATED ORAL DAILY
Qty: 90 TABLET | Refills: 0 | Status: SHIPPED | OUTPATIENT
Start: 2024-12-16

## 2024-12-17 DIAGNOSIS — G47.00 INSOMNIA, UNSPECIFIED TYPE: ICD-10-CM

## 2024-12-17 RX ORDER — ALPRAZOLAM 0.5 MG
0.5 TABLET ORAL 2 TIMES DAILY PRN
Qty: 60 TABLET | Refills: 0 | Status: SHIPPED | OUTPATIENT
Start: 2024-12-17 | End: 2025-01-16

## 2025-01-09 ENCOUNTER — PATIENT MESSAGE (OUTPATIENT)
Dept: PRIMARY CARE CLINIC | Age: 50
End: 2025-01-09

## 2025-01-09 DIAGNOSIS — F90.9 ATTENTION DEFICIT HYPERACTIVITY DISORDER (ADHD), UNSPECIFIED ADHD TYPE: ICD-10-CM

## 2025-01-09 RX ORDER — TRAZODONE HYDROCHLORIDE 100 MG/1
TABLET ORAL
Qty: 60 TABLET | Refills: 4 | Status: SHIPPED | OUTPATIENT
Start: 2025-01-09

## 2025-01-09 RX ORDER — METHYLPHENIDATE HYDROCHLORIDE 36 MG/1
72 TABLET, EXTENDED RELEASE ORAL DAILY
Qty: 60 TABLET | Refills: 0 | Status: SHIPPED | OUTPATIENT
Start: 2025-01-09 | End: 2025-02-08

## 2025-01-12 DIAGNOSIS — K21.9 GASTROESOPHAGEAL REFLUX DISEASE WITHOUT ESOPHAGITIS: ICD-10-CM

## 2025-01-16 DIAGNOSIS — G47.00 INSOMNIA, UNSPECIFIED TYPE: ICD-10-CM

## 2025-01-16 RX ORDER — ALPRAZOLAM 0.5 MG
0.5 TABLET ORAL 2 TIMES DAILY PRN
Qty: 60 TABLET | Refills: 0 | Status: SHIPPED | OUTPATIENT
Start: 2025-01-16 | End: 2025-02-15

## 2025-02-09 DIAGNOSIS — F90.9 ATTENTION DEFICIT HYPERACTIVITY DISORDER (ADHD), UNSPECIFIED ADHD TYPE: ICD-10-CM

## 2025-02-10 RX ORDER — METHYLPHENIDATE HYDROCHLORIDE 36 MG/1
72 TABLET, EXTENDED RELEASE ORAL DAILY
Qty: 60 TABLET | Refills: 0 | Status: SHIPPED | OUTPATIENT
Start: 2025-02-10 | End: 2025-03-12

## 2025-02-14 DIAGNOSIS — F25.1 SCHIZOAFFECTIVE DISORDER, DEPRESSIVE TYPE (HCC): ICD-10-CM

## 2025-02-17 ENCOUNTER — PATIENT MESSAGE (OUTPATIENT)
Dept: PRIMARY CARE CLINIC | Age: 50
End: 2025-02-17

## 2025-02-17 DIAGNOSIS — G47.00 INSOMNIA, UNSPECIFIED TYPE: ICD-10-CM

## 2025-02-17 RX ORDER — ALPRAZOLAM 0.5 MG
0.5 TABLET ORAL 2 TIMES DAILY PRN
Qty: 60 TABLET | Refills: 0 | Status: SHIPPED | OUTPATIENT
Start: 2025-02-17 | End: 2025-03-19

## 2025-02-24 SDOH — ECONOMIC STABILITY: FOOD INSECURITY: WITHIN THE PAST 12 MONTHS, YOU WORRIED THAT YOUR FOOD WOULD RUN OUT BEFORE YOU GOT MONEY TO BUY MORE.: NEVER TRUE

## 2025-02-24 SDOH — ECONOMIC STABILITY: INCOME INSECURITY: IN THE LAST 12 MONTHS, WAS THERE A TIME WHEN YOU WERE NOT ABLE TO PAY THE MORTGAGE OR RENT ON TIME?: NO

## 2025-02-24 SDOH — ECONOMIC STABILITY: FOOD INSECURITY: WITHIN THE PAST 12 MONTHS, THE FOOD YOU BOUGHT JUST DIDN'T LAST AND YOU DIDN'T HAVE MONEY TO GET MORE.: NEVER TRUE

## 2025-02-24 ASSESSMENT — COLUMBIA-SUICIDE SEVERITY RATING SCALE - C-SSRS
1. IN THE PAST MONTH, HAVE YOU WISHED YOU WERE DEAD OR WISHED YOU COULD GO TO SLEEP AND NOT WAKE UP?: NO
7. DID THIS OCCUR IN THE LAST THREE MONTHS: NO
2. IN THE PAST MONTH, HAVE YOU ACTUALLY HAD ANY THOUGHTS OF KILLING YOURSELF?: NO
6. IN YOUR LIFETIME, HAVE YOU EVER DONE ANYTHING, STARTED TO DO ANYTHING, OR PREPARED TO DO ANYTHING TO END YOUR LIFE?: YES

## 2025-02-24 ASSESSMENT — PATIENT HEALTH QUESTIONNAIRE - PHQ9
4. FEELING TIRED OR HAVING LITTLE ENERGY: NEARLY EVERY DAY
1. LITTLE INTEREST OR PLEASURE IN DOING THINGS: MORE THAN HALF THE DAYS
2. FEELING DOWN, DEPRESSED OR HOPELESS: MORE THAN HALF THE DAYS
10. IF YOU CHECKED OFF ANY PROBLEMS, HOW DIFFICULT HAVE THESE PROBLEMS MADE IT FOR YOU TO DO YOUR WORK, TAKE CARE OF THINGS AT HOME, OR GET ALONG WITH OTHER PEOPLE: VERY DIFFICULT
6. FEELING BAD ABOUT YOURSELF - OR THAT YOU ARE A FAILURE OR HAVE LET YOURSELF OR YOUR FAMILY DOWN: NEARLY EVERY DAY
3. TROUBLE FALLING OR STAYING ASLEEP: NEARLY EVERY DAY
5. POOR APPETITE OR OVEREATING: NEARLY EVERY DAY
7. TROUBLE CONCENTRATING ON THINGS, SUCH AS READING THE NEWSPAPER OR WATCHING TELEVISION: MORE THAN HALF THE DAYS
2. FEELING DOWN, DEPRESSED OR HOPELESS: MORE THAN HALF THE DAYS
SUM OF ALL RESPONSES TO PHQ QUESTIONS 1-9: 21
SUM OF ALL RESPONSES TO PHQ QUESTIONS 1-9: 21
SUM OF ALL RESPONSES TO PHQ QUESTIONS 1-9: 20
9. THOUGHTS THAT YOU WOULD BE BETTER OFF DEAD, OR OF HURTING YOURSELF: SEVERAL DAYS
SUM OF ALL RESPONSES TO PHQ QUESTIONS 1-9: 21
8. MOVING OR SPEAKING SO SLOWLY THAT OTHER PEOPLE COULD HAVE NOTICED. OR THE OPPOSITE, BEING SO FIGETY OR RESTLESS THAT YOU HAVE BEEN MOVING AROUND A LOT MORE THAN USUAL: MORE THAN HALF THE DAYS
9. THOUGHTS THAT YOU WOULD BE BETTER OFF DEAD, OR OF HURTING YOURSELF: SEVERAL DAYS
8. MOVING OR SPEAKING SO SLOWLY THAT OTHER PEOPLE COULD HAVE NOTICED. OR THE OPPOSITE - BEING SO FIDGETY OR RESTLESS THAT YOU HAVE BEEN MOVING AROUND A LOT MORE THAN USUAL: MORE THAN HALF THE DAYS
SUM OF ALL RESPONSES TO PHQ QUESTIONS 1-9: 21
SUM OF ALL RESPONSES TO PHQ9 QUESTIONS 1 & 2: 4
1. LITTLE INTEREST OR PLEASURE IN DOING THINGS: MORE THAN HALF THE DAYS
5. POOR APPETITE OR OVEREATING: NEARLY EVERY DAY
10. IF YOU CHECKED OFF ANY PROBLEMS, HOW DIFFICULT HAVE THESE PROBLEMS MADE IT FOR YOU TO DO YOUR WORK, TAKE CARE OF THINGS AT HOME, OR GET ALONG WITH OTHER PEOPLE: VERY DIFFICULT
7. TROUBLE CONCENTRATING ON THINGS, SUCH AS READING THE NEWSPAPER OR WATCHING TELEVISION: MORE THAN HALF THE DAYS
3. TROUBLE FALLING OR STAYING ASLEEP: NEARLY EVERY DAY
4. FEELING TIRED OR HAVING LITTLE ENERGY: NEARLY EVERY DAY
6. FEELING BAD ABOUT YOURSELF - OR THAT YOU ARE A FAILURE OR HAVE LET YOURSELF OR YOUR FAMILY DOWN: NEARLY EVERY DAY

## 2025-02-25 ENCOUNTER — OFFICE VISIT (OUTPATIENT)
Dept: PRIMARY CARE CLINIC | Age: 50
End: 2025-02-25
Payer: MEDICAID

## 2025-02-25 VITALS
OXYGEN SATURATION: 97 % | DIASTOLIC BLOOD PRESSURE: 76 MMHG | WEIGHT: 268.8 LBS | HEART RATE: 95 BPM | BODY MASS INDEX: 33.6 KG/M2 | RESPIRATION RATE: 16 BRPM | SYSTOLIC BLOOD PRESSURE: 108 MMHG

## 2025-02-25 DIAGNOSIS — F25.1 SCHIZOAFFECTIVE DISORDER, DEPRESSIVE TYPE (HCC): Primary | ICD-10-CM

## 2025-02-25 DIAGNOSIS — I10 ESSENTIAL HYPERTENSION: ICD-10-CM

## 2025-02-25 DIAGNOSIS — K21.9 GASTROESOPHAGEAL REFLUX DISEASE WITHOUT ESOPHAGITIS: ICD-10-CM

## 2025-02-25 DIAGNOSIS — F90.9 ATTENTION DEFICIT HYPERACTIVITY DISORDER (ADHD), UNSPECIFIED ADHD TYPE: ICD-10-CM

## 2025-02-25 PROCEDURE — 3078F DIAST BP <80 MM HG: CPT | Performed by: NURSE PRACTITIONER

## 2025-02-25 PROCEDURE — 99214 OFFICE O/P EST MOD 30 MIN: CPT | Performed by: NURSE PRACTITIONER

## 2025-02-25 PROCEDURE — G8417 CALC BMI ABV UP PARAM F/U: HCPCS | Performed by: NURSE PRACTITIONER

## 2025-02-25 PROCEDURE — 3074F SYST BP LT 130 MM HG: CPT | Performed by: NURSE PRACTITIONER

## 2025-02-25 PROCEDURE — G8427 DOCREV CUR MEDS BY ELIG CLIN: HCPCS | Performed by: NURSE PRACTITIONER

## 2025-02-25 PROCEDURE — 1036F TOBACCO NON-USER: CPT | Performed by: NURSE PRACTITIONER

## 2025-02-25 RX ORDER — ARIPIPRAZOLE 15 MG/1
15 TABLET ORAL DAILY
Qty: 90 TABLET | Refills: 2 | Status: SHIPPED | OUTPATIENT
Start: 2025-02-25 | End: 2025-11-22

## 2025-02-25 RX ORDER — OMEPRAZOLE 20 MG/1
CAPSULE, DELAYED RELEASE ORAL
Qty: 180 CAPSULE | Refills: 0 | Status: CANCELLED | OUTPATIENT
Start: 2025-02-25

## 2025-02-25 ASSESSMENT — ENCOUNTER SYMPTOMS
SHORTNESS OF BREATH: 0
SINUS PRESSURE: 0
VOMITING: 0
TROUBLE SWALLOWING: 0
NAUSEA: 0
EYE DISCHARGE: 0
ABDOMINAL PAIN: 0
SORE THROAT: 0
WHEEZING: 0
CHEST TIGHTNESS: 0
DIARRHEA: 0
COUGH: 0
EYE ITCHING: 0
SINUS PAIN: 0
EYE REDNESS: 0

## 2025-02-25 NOTE — PROGRESS NOTES
[facility]  [facility]  [address] [place] [address] [address]  [address]  [facility] [address]  Dept: [phone]  Dept Fax: [phone]    Lucien Nielsen is a 49 y.o. male who presentstoday for his medical conditions/complaints as noted below.  Lucien Nielsen is c/o of  Chief Complaint   Patient presents with    Medication Check         HPI:     History of Present Illness  The patient presents for evaluation of weight management, sleep apnea, and mental health.    He has been experiencing a gradual decrease in weight, with a loss of approximately 2 to 3 pounds over the past month. This weight loss is attributed to an increase in physical activity. He has also incorporated a daily smoothie into his diet, which includes 32 ounces of water, 1.5 cups of mixed berries, a whole apple, and flaxseeds. He occasionally supplements his diet with Metamucil but prefers to manage his dietary fiber intake through natural sources.    His sleep pattern remains consistent, characterized by difficulty initiating sleep and frequent awakenings during the early morning hours. He typically wakes up around 5 AM and experiences restlessness. Despite these issues, he believes he achieves some quality sleep during the night. He has been diagnosed with sleep apnea but is unable to tolerate CPAP therapy due to feelings of claustrophobia. He has not considered the Inspire procedure as a potential treatment option.    He reports satisfactory control of his symptoms with Concerta, Abilify, and Prozac, although he acknowledges that his condition is not completely resolved. He describes his current state as stable, without significant fluctuations in his mood or symptoms. He has recently initiated counseling services at [facility] and has found the experience beneficial. He has a scheduled appointment with a psychiatrist in 04/2025. He is seeking refills for his medications, including Abilify, Xanax, and Concerta.

## 2025-03-03 ENCOUNTER — PATIENT MESSAGE (OUTPATIENT)
Dept: PRIMARY CARE CLINIC | Age: 50
End: 2025-03-03

## 2025-03-11 DIAGNOSIS — F90.9 ATTENTION DEFICIT HYPERACTIVITY DISORDER (ADHD), UNSPECIFIED ADHD TYPE: ICD-10-CM

## 2025-03-11 RX ORDER — METHYLPHENIDATE HYDROCHLORIDE 36 MG/1
72 TABLET, EXTENDED RELEASE ORAL DAILY
Qty: 60 TABLET | Refills: 0 | Status: SHIPPED | OUTPATIENT
Start: 2025-03-11 | End: 2025-04-10

## 2025-03-12 ENCOUNTER — TELEPHONE (OUTPATIENT)
Dept: PRIMARY CARE CLINIC | Age: 50
End: 2025-03-12

## 2025-03-12 NOTE — TELEPHONE ENCOUNTER
Patient notified and verbalized understanding. Patient asked about isolation- writer advised patient to inquire about what CDC's current recommendations are.

## 2025-03-12 NOTE — TELEPHONE ENCOUNTER
Patient called reporting he tested positive for covid today 3-12-25 and would like recommendations on how to treat. Patient complains of a productive cough, fever of 102, body aches and chills. Please advise.

## 2025-03-12 NOTE — TELEPHONE ENCOUNTER
Generally just supportive care. Fluids, rest, tylenol or motrin. Symptoms can last 7-10 days sometimes.

## 2025-03-17 ENCOUNTER — E-VISIT (OUTPATIENT)
Dept: PRIMARY CARE CLINIC | Age: 50
End: 2025-03-17
Payer: MEDICAID

## 2025-03-17 DIAGNOSIS — R05.1 ACUTE COUGH: Primary | ICD-10-CM

## 2025-03-17 DIAGNOSIS — U07.1 COVID: Primary | ICD-10-CM

## 2025-03-17 PROCEDURE — 99422 OL DIG E/M SVC 11-20 MIN: CPT | Performed by: NURSE PRACTITIONER

## 2025-03-17 RX ORDER — PREDNISONE 20 MG/1
TABLET ORAL
Qty: 18 TABLET | Refills: 0 | Status: SHIPPED | OUTPATIENT
Start: 2025-03-17 | End: 2025-03-27

## 2025-03-17 RX ORDER — BENZONATATE 200 MG/1
200 CAPSULE ORAL 3 TIMES DAILY PRN
Qty: 30 CAPSULE | Refills: 0 | Status: SHIPPED | OUTPATIENT
Start: 2025-03-17 | End: 2025-03-27

## 2025-03-17 RX ORDER — CODEINE PHOSPHATE AND GUAIFENESIN 10; 100 MG/5ML; MG/5ML
5 SOLUTION ORAL 3 TIMES DAILY PRN
Qty: 118 ML | Refills: 0 | Status: SHIPPED
Start: 2025-03-17 | End: 2025-03-19 | Stop reason: SDUPTHER

## 2025-03-17 NOTE — PROGRESS NOTES
Reviewed questionnaire    Reviewed meds/allergies    Dx Cough    Plan Rx given for tessalon, prednisone and cheratussin. Follow up with PCP if no improvement    Time spent on visit 11 min

## 2025-03-19 ENCOUNTER — PATIENT MESSAGE (OUTPATIENT)
Dept: PRIMARY CARE CLINIC | Age: 50
End: 2025-03-19

## 2025-03-19 DIAGNOSIS — R05.1 ACUTE COUGH: ICD-10-CM

## 2025-03-19 RX ORDER — CODEINE PHOSPHATE AND GUAIFENESIN 10; 100 MG/5ML; MG/5ML
5 SOLUTION ORAL 3 TIMES DAILY PRN
Qty: 118 ML | Refills: 0 | Status: SHIPPED | OUTPATIENT
Start: 2025-03-19 | End: 2025-03-26

## 2025-03-20 ENCOUNTER — PATIENT MESSAGE (OUTPATIENT)
Dept: PRIMARY CARE CLINIC | Age: 50
End: 2025-03-20

## 2025-03-20 DIAGNOSIS — G47.00 INSOMNIA, UNSPECIFIED TYPE: ICD-10-CM

## 2025-03-20 RX ORDER — ALPRAZOLAM 0.5 MG
0.5 TABLET ORAL 2 TIMES DAILY PRN
Qty: 60 TABLET | Refills: 0 | Status: SHIPPED | OUTPATIENT
Start: 2025-03-20 | End: 2025-04-19

## 2025-03-24 ENCOUNTER — PATIENT MESSAGE (OUTPATIENT)
Dept: PRIMARY CARE CLINIC | Age: 50
End: 2025-03-24

## 2025-03-29 DIAGNOSIS — K21.9 GASTROESOPHAGEAL REFLUX DISEASE WITHOUT ESOPHAGITIS: ICD-10-CM

## 2025-03-29 RX ORDER — OMEPRAZOLE 20 MG/1
20 CAPSULE, DELAYED RELEASE ORAL 2 TIMES DAILY
Qty: 180 CAPSULE | Refills: 0 | Status: SHIPPED | OUTPATIENT
Start: 2025-03-29

## 2025-03-31 RX ORDER — FENOFIBRATE 145 MG/1
145 TABLET, COATED ORAL DAILY
Qty: 90 TABLET | Refills: 0 | Status: SHIPPED | OUTPATIENT
Start: 2025-03-31

## 2025-04-08 DIAGNOSIS — F90.9 ATTENTION DEFICIT HYPERACTIVITY DISORDER (ADHD), UNSPECIFIED ADHD TYPE: ICD-10-CM

## 2025-04-08 RX ORDER — METHYLPHENIDATE HYDROCHLORIDE 36 MG/1
72 TABLET, EXTENDED RELEASE ORAL DAILY
Qty: 60 TABLET | Refills: 0 | Status: SHIPPED | OUTPATIENT
Start: 2025-04-08 | End: 2025-05-08

## 2025-04-08 RX ORDER — TRAZODONE HYDROCHLORIDE 100 MG/1
TABLET ORAL
Qty: 60 TABLET | Refills: 4 | Status: SHIPPED | OUTPATIENT
Start: 2025-04-08

## 2025-04-16 DIAGNOSIS — G47.00 INSOMNIA, UNSPECIFIED TYPE: ICD-10-CM

## 2025-04-16 RX ORDER — ALPRAZOLAM 0.5 MG
0.5 TABLET ORAL 2 TIMES DAILY PRN
Qty: 60 TABLET | Refills: 0 | Status: SHIPPED | OUTPATIENT
Start: 2025-04-16 | End: 2025-05-16

## 2025-06-15 RX ORDER — FENOFIBRATE 145 MG/1
145 TABLET, FILM COATED ORAL DAILY
Qty: 90 TABLET | Refills: 0 | Status: SHIPPED | OUTPATIENT
Start: 2025-06-15

## 2025-07-14 ENCOUNTER — PATIENT MESSAGE (OUTPATIENT)
Dept: PRIMARY CARE CLINIC | Age: 50
End: 2025-07-14

## 2025-07-14 DIAGNOSIS — K59.04 CHRONIC IDIOPATHIC CONSTIPATION: Primary | ICD-10-CM

## 2025-07-15 DIAGNOSIS — K21.9 GASTROESOPHAGEAL REFLUX DISEASE WITHOUT ESOPHAGITIS: ICD-10-CM

## 2025-07-15 RX ORDER — OMEPRAZOLE 20 MG/1
20 CAPSULE, DELAYED RELEASE ORAL 2 TIMES DAILY
Qty: 180 CAPSULE | Refills: 3 | Status: SHIPPED | OUTPATIENT
Start: 2025-07-15

## 2025-08-06 ENCOUNTER — TELEPHONE (OUTPATIENT)
Dept: PRIMARY CARE CLINIC | Age: 50
End: 2025-08-06

## 2025-08-06 DIAGNOSIS — G47.00 INSOMNIA, UNSPECIFIED TYPE: Primary | ICD-10-CM
